# Patient Record
Sex: FEMALE | Race: OTHER | HISPANIC OR LATINO
[De-identification: names, ages, dates, MRNs, and addresses within clinical notes are randomized per-mention and may not be internally consistent; named-entity substitution may affect disease eponyms.]

---

## 2017-02-02 ENCOUNTER — RECORD ABSTRACTING (OUTPATIENT)
Age: 42
End: 2017-02-02

## 2017-02-21 ENCOUNTER — APPOINTMENT (OUTPATIENT)
Dept: DERMATOLOGY | Facility: CLINIC | Age: 42
End: 2017-02-21

## 2017-03-08 ENCOUNTER — APPOINTMENT (OUTPATIENT)
Dept: UROGYNECOLOGY | Facility: CLINIC | Age: 42
End: 2017-03-08

## 2017-03-08 VITALS — DIASTOLIC BLOOD PRESSURE: 60 MMHG | SYSTOLIC BLOOD PRESSURE: 112 MMHG | WEIGHT: 138 LBS | BODY MASS INDEX: 23.69 KG/M2

## 2017-03-08 DIAGNOSIS — M79.602 PAIN IN RIGHT ARM: ICD-10-CM

## 2017-03-08 DIAGNOSIS — M79.601 PAIN IN RIGHT ARM: ICD-10-CM

## 2017-04-11 ENCOUNTER — APPOINTMENT (OUTPATIENT)
Dept: NEUROLOGY | Facility: CLINIC | Age: 42
End: 2017-04-11

## 2017-04-11 VITALS
HEIGHT: 64 IN | DIASTOLIC BLOOD PRESSURE: 90 MMHG | BODY MASS INDEX: 23.9 KG/M2 | WEIGHT: 140 LBS | HEART RATE: 82 BPM | SYSTOLIC BLOOD PRESSURE: 130 MMHG

## 2017-04-11 DIAGNOSIS — G56.00 CARPAL TUNNEL SYNDROME, UNSPECIFIED UPPER LIMB: ICD-10-CM

## 2017-04-13 ENCOUNTER — APPOINTMENT (OUTPATIENT)
Dept: UROLOGY | Facility: CLINIC | Age: 42
End: 2017-04-13

## 2017-04-13 VITALS — DIASTOLIC BLOOD PRESSURE: 80 MMHG | SYSTOLIC BLOOD PRESSURE: 120 MMHG | HEART RATE: 74 BPM

## 2017-04-13 DIAGNOSIS — N20.0 CALCULUS OF KIDNEY: ICD-10-CM

## 2017-04-25 ENCOUNTER — APPOINTMENT (OUTPATIENT)
Dept: OBGYN | Facility: CLINIC | Age: 42
End: 2017-04-25

## 2017-04-25 ENCOUNTER — OUTPATIENT (OUTPATIENT)
Dept: OUTPATIENT SERVICES | Facility: HOSPITAL | Age: 42
LOS: 1 days | Discharge: HOME | End: 2017-04-25

## 2017-04-25 VITALS
SYSTOLIC BLOOD PRESSURE: 120 MMHG | DIASTOLIC BLOOD PRESSURE: 70 MMHG | WEIGHT: 141 LBS | HEIGHT: 63 IN | BODY MASS INDEX: 24.98 KG/M2

## 2017-04-25 DIAGNOSIS — R76.11 NONSPECIFIC REACTION TO TUBERCULIN SKIN TEST W/OUT ACTIVE TUBERCULOSIS: ICD-10-CM

## 2017-04-25 DIAGNOSIS — Z83.3 FAMILY HISTORY OF DIABETES MELLITUS: ICD-10-CM

## 2017-04-25 DIAGNOSIS — Z78.9 OTHER SPECIFIED HEALTH STATUS: ICD-10-CM

## 2017-04-26 PROBLEM — Z78.9 DOES NOT USE ILLICIT DRUGS: Status: ACTIVE | Noted: 2017-04-26

## 2017-04-26 PROBLEM — R76.11 PPD POSITIVE: Status: RESOLVED | Noted: 2017-04-26 | Resolved: 2017-04-26

## 2017-05-01 ENCOUNTER — RESULT REVIEW (OUTPATIENT)
Age: 42
End: 2017-05-01

## 2017-05-08 LAB
HPV I/H RISK 1 DNA CVX QL PROBE+SIG AMP: NOT DETECTED
HPV LOW RISK DNA CVX QL PROBE+SIG AMP: NOT DETECTED

## 2017-06-28 DIAGNOSIS — Z01.20 ENCOUNTER FOR DENTAL EXAMINATION AND CLEANING WITHOUT ABNORMAL FINDINGS: ICD-10-CM

## 2017-09-20 ENCOUNTER — APPOINTMENT (OUTPATIENT)
Dept: UROGYNECOLOGY | Facility: CLINIC | Age: 42
End: 2017-09-20

## 2018-07-03 ENCOUNTER — RX RENEWAL (OUTPATIENT)
Age: 43
End: 2018-07-03

## 2018-08-07 ENCOUNTER — APPOINTMENT (OUTPATIENT)
Dept: NEUROLOGY | Facility: CLINIC | Age: 43
End: 2018-08-07

## 2018-10-02 ENCOUNTER — APPOINTMENT (OUTPATIENT)
Dept: NEUROLOGY | Facility: CLINIC | Age: 43
End: 2018-10-02

## 2018-10-30 ENCOUNTER — APPOINTMENT (OUTPATIENT)
Dept: NEUROLOGY | Facility: CLINIC | Age: 43
End: 2018-10-30

## 2018-10-30 ENCOUNTER — OUTPATIENT (OUTPATIENT)
Dept: OUTPATIENT SERVICES | Facility: HOSPITAL | Age: 43
LOS: 1 days | Discharge: HOME | End: 2018-10-30

## 2018-10-30 VITALS
WEIGHT: 141 LBS | TEMPERATURE: 98.2 F | HEIGHT: 63 IN | SYSTOLIC BLOOD PRESSURE: 128 MMHG | DIASTOLIC BLOOD PRESSURE: 83 MMHG | BODY MASS INDEX: 24.98 KG/M2 | HEART RATE: 86 BPM

## 2018-10-30 NOTE — HISTORY OF PRESENT ILLNESS
[FreeTextEntry1] : follow up  [de-identified] : 42 year old female with history of migraine and hand weakness, presents for follow up \par She didn't have any migraine episode for the past 4 months, taking Amytriptan 10 mg daily and Excedrin. \par Last visit she was complaining of wrist weakness; for which wrist splint prescribed and EMG/NCS ordered \par weakness improved by itself as per patient, she didn't’t use the splint. \par she feels well this visit, denies any nausea, vomiting or headache \par

## 2018-10-30 NOTE — END OF VISIT
[] : Resident [FreeTextEntry3] : Patient examined.  Headaches much improved, no longer needs amitriptyline (wasn't taking it regularly anyway).\par F/u as needed.

## 2018-10-30 NOTE — ASSESSMENT
[FreeTextEntry1] : patient is 43 year old female with PMH of migraine \par presents for follow up \par \par - no migraine attacks for 4 months \par - will d/c Amitriptyline \par - follow up as needed

## 2018-12-05 ENCOUNTER — APPOINTMENT (OUTPATIENT)
Dept: INTERNAL MEDICINE | Facility: CLINIC | Age: 43
End: 2018-12-05

## 2018-12-14 ENCOUNTER — APPOINTMENT (OUTPATIENT)
Dept: GASTROENTEROLOGY | Facility: CLINIC | Age: 43
End: 2018-12-14

## 2018-12-14 ENCOUNTER — OUTPATIENT (OUTPATIENT)
Dept: OUTPATIENT SERVICES | Facility: HOSPITAL | Age: 43
LOS: 1 days | Discharge: HOME | End: 2018-12-14

## 2018-12-14 VITALS
HEART RATE: 79 BPM | WEIGHT: 143 LBS | HEIGHT: 63 IN | SYSTOLIC BLOOD PRESSURE: 148 MMHG | BODY MASS INDEX: 25.34 KG/M2 | DIASTOLIC BLOOD PRESSURE: 90 MMHG

## 2018-12-14 DIAGNOSIS — K59.00 CONSTIPATION, UNSPECIFIED: ICD-10-CM

## 2019-01-14 LAB
ALBUMIN SERPL ELPH-MCNC: 4.4 G/DL
ALP BLD-CCNC: 79 U/L
ALT SERPL-CCNC: 22 U/L
ANION GAP SERPL CALC-SCNC: 15 MMOL/L
AST SERPL-CCNC: 27 U/L
BASOPHILS # BLD AUTO: 0.04 K/UL
BASOPHILS NFR BLD AUTO: 0.7 %
BILIRUB SERPL-MCNC: 0.6 MG/DL
BUN SERPL-MCNC: 10 MG/DL
CALCIUM SERPL-MCNC: 8.9 MG/DL
CHLORIDE SERPL-SCNC: 104 MMOL/L
CO2 SERPL-SCNC: 22 MMOL/L
CREAT SERPL-MCNC: 0.6 MG/DL
EOSINOPHIL # BLD AUTO: 0.11 K/UL
EOSINOPHIL NFR BLD AUTO: 1.8 %
GLUCOSE SERPL-MCNC: 88 MG/DL
HCT VFR BLD CALC: 34.5 %
HGB BLD-MCNC: 10.9 G/DL
IMM GRANULOCYTES NFR BLD AUTO: 0.3 %
INR PPP: 0.95 RATIO
LYMPHOCYTES # BLD AUTO: 1.69 K/UL
LYMPHOCYTES NFR BLD AUTO: 28 %
MAN DIFF?: NORMAL
MCHC RBC-ENTMCNC: 26.7 PG
MCHC RBC-ENTMCNC: 31.6 G/DL
MCV RBC AUTO: 84.4 FL
MONOCYTES # BLD AUTO: 0.37 K/UL
MONOCYTES NFR BLD AUTO: 6.1 %
NEUTROPHILS # BLD AUTO: 3.81 K/UL
NEUTROPHILS NFR BLD AUTO: 63.1 %
PLATELET # BLD AUTO: 258 K/UL
POTASSIUM SERPL-SCNC: 4.4 MMOL/L
PROT SERPL-MCNC: 6.5 G/DL
PT BLD: 10.9 SEC
RBC # BLD: 4.09 M/UL
RBC # FLD: 15 %
SODIUM SERPL-SCNC: 141 MMOL/L
TSH SERPL-ACNC: 2.53 UIU/ML
WBC # FLD AUTO: 6.04 K/UL

## 2019-01-23 ENCOUNTER — OUTPATIENT (OUTPATIENT)
Dept: OUTPATIENT SERVICES | Facility: HOSPITAL | Age: 44
LOS: 1 days | Discharge: HOME | End: 2019-01-23

## 2019-01-23 ENCOUNTER — TRANSCRIPTION ENCOUNTER (OUTPATIENT)
Age: 44
End: 2019-01-23

## 2019-01-23 VITALS
RESPIRATION RATE: 18 BRPM | SYSTOLIC BLOOD PRESSURE: 124 MMHG | OXYGEN SATURATION: 98 % | DIASTOLIC BLOOD PRESSURE: 86 MMHG | HEART RATE: 78 BPM

## 2019-01-23 VITALS
HEART RATE: 83 BPM | RESPIRATION RATE: 18 BRPM | WEIGHT: 134.04 LBS | HEIGHT: 64 IN | SYSTOLIC BLOOD PRESSURE: 135 MMHG | TEMPERATURE: 98 F | DIASTOLIC BLOOD PRESSURE: 94 MMHG

## 2019-01-23 NOTE — CHART NOTE - NSCHARTNOTEFT_GEN_A_CORE
PACU ANESTHESIA ADMISSION NOTE      Procedure: Colonoscopy  Post op diagnosis:      ____  Intubated  TV:______       Rate: ______      FiO2: ______    _x___  Patent Airway    _x___  Full return of protective reflexes    _x___  Full recovery from anesthesia / back to baseline status    Vitals:  BP:            100/53     HR:  70  RR:   20  Temp:  O2Sat:  98    Mental Status:  _x___ Awake   _____ Alert   _____ Drowsy   _____ Sedated    Nausea/Vomiting:  _x___  NO       ______Yes,   See Post - Op Orders         Pain Scale (0-10):  __0___    Treatment: _x___ None    ____ See Post - Op/PCA Orders    Post - Operative Fluids:   __x__ Oral   ____ See Post - Op Orders    Plan: Discharge:   _x___Home       _____Floor     _____Critical Care    _____  Other:_________________    Comments:  Intraoperative course uneventful.  No anesthesia issues or complications noted.  Discharge when criteria met.

## 2019-01-23 NOTE — ASU DISCHARGE PLAN (ADULT/PEDIATRIC). - NOTIFY
Bleeding that does not stop/Excessive Diarrhea/Pain not relieved by Medications/Persistent Nausea and Vomiting/Swelling that continues/Unable to Urinate/Fever greater than 101/Inability to Tolerate Liquids or Foods

## 2019-01-23 NOTE — ASU PREOP CHECKLIST - 1.
pt.states Jehovah witness. Pt. presented no blood health care proxy. [facility] medicine [facility] program contacted. Message left. Blood refusal consent signed. MD Fu aware pt.states Jehovah witness. Pt. presented no blood health care proxy. [facility] program contacted. Message left. Blood refusal consent signed. MD Fu aware by ERNA Muñoz. Blood consent refusal to be signed . pt.states Jehovah witness. Pt. presented no blood health care proxy. [facility] program contacted. Message left. Blood refusal consent to be signed. MD Fu aware by ERNA Muñoz.

## 2019-01-23 NOTE — H&P PST ADULT - HISTORY OF PRESENT ILLNESS
patient s here for colonoscopy for chronic constipation and LLQ pain patient s here for colonoscopy for anemia, change in bms (constipation) and LLQ pain

## 2019-01-23 NOTE — ASU PATIENT PROFILE, ADULT - BLOOD AVOIDANCE/RESTRICTIONS, PROFILE
Uatsdin beliefs/Message left for bloodless medicine surgery program, blood refusal consent signed and MD Fu aware by ERNA Muñoz Message left for bloodless medicine surgery program, blood refusal consent to be signed and MD Fu aware by RN Wanda/Rastafari beliefs

## 2019-01-28 DIAGNOSIS — D64.9 ANEMIA, UNSPECIFIED: ICD-10-CM

## 2019-01-28 DIAGNOSIS — K64.4 RESIDUAL HEMORRHOIDAL SKIN TAGS: ICD-10-CM

## 2019-01-28 DIAGNOSIS — K64.8 OTHER HEMORRHOIDS: ICD-10-CM

## 2019-03-08 ENCOUNTER — OUTPATIENT (OUTPATIENT)
Dept: OUTPATIENT SERVICES | Facility: HOSPITAL | Age: 44
LOS: 1 days | Discharge: HOME | End: 2019-03-08

## 2019-03-08 ENCOUNTER — APPOINTMENT (OUTPATIENT)
Dept: GASTROENTEROLOGY | Facility: CLINIC | Age: 44
End: 2019-03-08

## 2019-03-08 VITALS
DIASTOLIC BLOOD PRESSURE: 80 MMHG | BODY MASS INDEX: 24.98 KG/M2 | HEIGHT: 63 IN | SYSTOLIC BLOOD PRESSURE: 124 MMHG | HEART RATE: 86 BPM | WEIGHT: 141 LBS

## 2019-03-08 NOTE — ASSESSMENT
[FreeTextEntry1] : A 44 y old female patient with a hx of Migraine headache presented for follow up colonoscopy results. Patient denies any abdominal pain , vomiting, diarrhea, blood in the stools. Patient now taking Miralax once daily and she is going once daily to the bathroom . \par Denies any complaints \par  \par # Constipation, sx resolved on Miralax\par Blood work normal \par Colonoscopy showed internal hemorrhoids \par Continue Miralax \par Follow with GI as needed

## 2019-03-08 NOTE — PHYSICAL EXAM
[General Appearance - Alert] : alert [General Appearance - In No Acute Distress] : in no acute distress [General Appearance - Well Nourished] : well nourished [Sclera] : the sclera and conjunctiva were normal [Extraocular Movements] : extraocular movements were intact [Outer Ear] : the ears and nose were normal in appearance [Hearing Threshold Finger Rub Not [place]] : hearing was normal [Neck Appearance] : the appearance of the neck was normal [Neck Cervical Mass (___cm)] : no neck mass was observed [] : no respiratory distress [Exaggerated Use Of Accessory Muscles For Inspiration] : no accessory muscle use [Bowel Sounds] : normal bowel sounds [Abdomen Soft] : soft [Abdomen Tenderness] : non-tender [No CVA Tenderness] : no ~M costovertebral angle tenderness [Abnormal Walk] : normal gait [Oriented To Time, Place, And Person] : oriented to person, place, and time

## 2019-03-08 NOTE — HISTORY OF PRESENT ILLNESS
[de-identified] : A 44 y old female patient with a hx of Migraine headache presented for follow up colonoscopy results. Patient denies any abdominal pain , vomiting, diarrhea, blood in the stools. Patient now taking Miralax once daily and she is going once daily to the bathroom . \par Denies any complaints \par

## 2019-12-23 NOTE — PRE-ANESTHESIA EVALUATION ADULT - WEIGHT IN KG
----- Message from Shu Bourgeois MD sent at 12/22/2019  3:41 PM EST -----  Can call family with results - throat culture was negative. Call if new/worsening symptoms.
Mother notified of negative throat culture results, verbalized understanding.
60.8
60.8

## 2020-02-03 ENCOUNTER — OUTPATIENT (OUTPATIENT)
Dept: OUTPATIENT SERVICES | Facility: HOSPITAL | Age: 45
LOS: 1 days | Discharge: HOME | End: 2020-02-03

## 2020-02-03 ENCOUNTER — APPOINTMENT (OUTPATIENT)
Dept: OBGYN | Facility: CLINIC | Age: 45
End: 2020-02-03
Payer: COMMERCIAL

## 2020-02-03 VITALS
SYSTOLIC BLOOD PRESSURE: 118 MMHG | WEIGHT: 127 LBS | DIASTOLIC BLOOD PRESSURE: 76 MMHG | HEIGHT: 63 IN | BODY MASS INDEX: 22.5 KG/M2

## 2020-02-03 PROCEDURE — 99386 PREV VISIT NEW AGE 40-64: CPT

## 2020-02-03 NOTE — HISTORY OF PRESENT ILLNESS
[___ Year(s) Ago] : [unfilled] year(s) ago [Last Pap ___] : Last cervical pap smear was [unfilled] [Regular Cycle Intervals] : periods have been regular [Sexually Active] : is sexually active

## 2020-02-04 DIAGNOSIS — Z01.419 ENCOUNTER FOR GYNECOLOGICAL EXAMINATION (GENERAL) (ROUTINE) WITHOUT ABNORMAL FINDINGS: ICD-10-CM

## 2020-02-05 LAB — HPV HIGH+LOW RISK DNA PNL CVX: NOT DETECTED

## 2020-02-17 ENCOUNTER — FORM ENCOUNTER (OUTPATIENT)
Age: 45
End: 2020-02-17

## 2020-02-18 ENCOUNTER — OUTPATIENT (OUTPATIENT)
Dept: OUTPATIENT SERVICES | Facility: HOSPITAL | Age: 45
LOS: 1 days | Discharge: HOME | End: 2020-02-18
Payer: OTHER GOVERNMENT

## 2020-02-18 DIAGNOSIS — Z12.31 ENCOUNTER FOR SCREENING MAMMOGRAM FOR MALIGNANT NEOPLASM OF BREAST: ICD-10-CM

## 2020-02-18 PROCEDURE — 77067 SCR MAMMO BI INCL CAD: CPT | Mod: 26

## 2020-02-18 PROCEDURE — 77063 BREAST TOMOSYNTHESIS BI: CPT | Mod: 26

## 2020-05-22 ENCOUNTER — APPOINTMENT (OUTPATIENT)
Dept: UROGYNECOLOGY | Facility: CLINIC | Age: 45
End: 2020-05-22
Payer: COMMERCIAL

## 2020-05-22 ENCOUNTER — OUTPATIENT (OUTPATIENT)
Dept: OUTPATIENT SERVICES | Facility: HOSPITAL | Age: 45
LOS: 1 days | Discharge: HOME | End: 2020-05-22

## 2020-05-22 VITALS
HEIGHT: 63 IN | BODY MASS INDEX: 23.04 KG/M2 | SYSTOLIC BLOOD PRESSURE: 90 MMHG | DIASTOLIC BLOOD PRESSURE: 60 MMHG | WEIGHT: 130 LBS

## 2020-05-22 DIAGNOSIS — Z87.448 PERSONAL HISTORY OF OTHER DISEASES OF URINARY SYSTEM: ICD-10-CM

## 2020-05-22 DIAGNOSIS — Z87.898 PERSONAL HISTORY OF OTHER SPECIFIED CONDITIONS: ICD-10-CM

## 2020-05-22 DIAGNOSIS — M79.18 MYALGIA, OTHER SITE: ICD-10-CM

## 2020-05-22 DIAGNOSIS — R29.898 OTHER SYMPTOMS AND SIGNS INVOLVING THE MUSCULOSKELETAL SYSTEM: ICD-10-CM

## 2020-05-22 DIAGNOSIS — Z01.419 ENCOUNTER FOR GYNECOLOGICAL EXAMINATION (GENERAL) (ROUTINE) W/OUT ABNORMAL FINDINGS: ICD-10-CM

## 2020-05-22 DIAGNOSIS — N39.41 URGE INCONTINENCE: ICD-10-CM

## 2020-05-22 DIAGNOSIS — N39.3 STRESS INCONTINENCE (FEMALE) (MALE): ICD-10-CM

## 2020-05-22 DIAGNOSIS — N81.2 INCOMPLETE UTEROVAGINAL PROLAPSE: ICD-10-CM

## 2020-05-22 DIAGNOSIS — Z87.39 PERSONAL HISTORY OF OTHER DISEASES OF THE MUSCULOSKELETAL SYSTEM AND CONNECTIVE TISSUE: ICD-10-CM

## 2020-05-22 DIAGNOSIS — R10.2 PELVIC AND PERINEAL PAIN: ICD-10-CM

## 2020-05-22 DIAGNOSIS — N39.46 MIXED INCONTINENCE: ICD-10-CM

## 2020-05-22 DIAGNOSIS — Z12.4 ENCOUNTER FOR SCREENING FOR MALIGNANT NEOPLASM OF CERVIX: ICD-10-CM

## 2020-05-22 PROCEDURE — 99205 OFFICE O/P NEW HI 60 MIN: CPT | Mod: 25

## 2020-05-22 PROCEDURE — 51701 INSERT BLADDER CATHETER: CPT

## 2020-05-22 NOTE — PHYSICAL EXAM
[Chaperone Present] : A chaperone was present in the examining room during all aspects of the physical examination [FreeTextEntry1] : Void: 500  cc\par PVR: 100 cc (normal PVR for this volume voided is 200cc or less)\par Urethra was prepped in sterile fashion and then a sterile catheter was used by me to drain the bladder.\par \par GH: 5.5    pB:  4.5  TVL: 12  C: -6  D: -9.5  Aa: -1  Ba: -1  Ap: 0  Bp: 0\par  \par No abdominal incisions noted\par normal perineal sensation\par normal perineal reflexes\par negative cough stress test\par negative atrophy\par positive urethral hypermobility\par bilateral levator ani spasm, positive tenderness\par no urethral tenderness\par no bladder tenderness\par no cervical tenderness\par no uterine tenderness\par no Mesh exposure\par 2/5 Kegel\par

## 2020-05-22 NOTE — DISCUSSION/SUMMARY
[FreeTextEntry1] : \par Myalgia-\par Discussed the pathophysiology of the above condition. Reviewed management options including medications (oral or vaginal suppository), injections, pelvic floor physical therapy, or referral for possible pudendal nerve blocks. The patient agrees to a referral to PT and medical management. The risks and benefits of flexeril was reviewed.\par \par Uterovaginal prolapse incomplete-\par The patient was counseled regarding the possible natural progression of prolapse and the clinical consequences of worsening prolapse. The stage and the location of the prolapse was reviewed with the patient. She was counseled regarding the management strategies including observation, pelvic floor physical therapy, pessary placement and surgery. The patient voiced understanding and agrees with a referral to PT.\par \par Urge incontinence-\par The pathophysiology of the above condition was discussed with the patient. The patient was given information and education on pelvic floor muscle exercises/rehabilitation, avoidance of dietary bladder irritants, and other strategies to improve bladder control, such as scheduled voiding. She was counseled regarding further management strategies for overactive bladder and urge incontinence including pelvic floor physical therapy, medications or surgical management. The patient voiced understanding and agrees with diet changes and a referral to PT. We will follow up on her urine tests.\par \par \par \par \par \par

## 2020-05-22 NOTE — HISTORY OF PRESENT ILLNESS
[FreeTextEntry1] : \par Pt with pelvic floor dysfunction here for urogynecologic evaluation. She describes: \par Referring provider: Dr López\par \par Chief PFD: pelvic pain\par \par 1/5/16 altis mini sling-excised for pain at the anchor sites\par 7/5/16 TVT\par \par Pelvic organ prolapse: s/p tubal, +bulge, for around 1 year, worsening, denies splinting\par Stress urinary incontinence: as above, no JUSTINO\par Overactive bladder syndrome: voids q20-30 minutes secondary to incontinence and pain that improves somewhat after urination, no eneuresis, urge incontinence daily, since the first surgery, s/p oxybutynin ER 10mg, does not take it anymore because she does not want to be addicted and because it made her mouth very dry, no glaucoma\par Voiding dysfunction: no Incomplete bladder emptying, yes hesitancy \par Lower urinary tract/vaginal symptoms: no recurrent UTIs per year, no hematuria, no dysuria, no bladder pain \par Fecal incontinence: denies\par Defecatory dysfunction: sausage\par Sexual dysfunction:  not active\par Pelvic pain: reports pain with working out, goes from the vagina and down her leg, sharp, intermittent\par Vaginal dryness: denies\par \par Her pelvic floor symptoms are significantly bothersome and negatively impacting her quality of life. \par \par

## 2020-05-22 NOTE — COUNSELING
[FreeTextEntry1] : \par We will notify you of the urine results if they are abnormal\par \par For 4-5 days, cut one item from the list out of your diet.\par \par After 4-5 days, it it makes a difference, you have to decide if it is worth keeping it out of your diet to help with the urinary issues.\par \par If it does not make a difference, you should add it back into your diet and remove another item for another 4-5 days.\par \par Please start the flexeril (muscle relaxant) twice a day for the pain. It can make you sleepy\par \par Please call my office if you have any issues with the cost or side effects of the medication.\par \par Referral to pelvic floor PT\par \par [facility]\par [address] [place] [id number]\par \par \par [facility]\par [place], [place]\par \par \par Schedule 6 week follow up with my PAJennifer (myalgia/UUI)

## 2020-05-26 LAB
APPEARANCE: CLEAR
BACTERIA UR CULT: NORMAL
BILIRUBIN URINE: NEGATIVE
BLOOD URINE: NEGATIVE
COLOR: COLORLESS
GLUCOSE QUALITATIVE U: NEGATIVE
KETONES URINE: NEGATIVE
LEUKOCYTE ESTERASE URINE: NEGATIVE
NITRITE URINE: NEGATIVE
PH URINE: 6.5
PROTEIN URINE: NEGATIVE
SPECIFIC GRAVITY URINE: 1
UROBILINOGEN URINE: NORMAL

## 2020-05-27 DIAGNOSIS — N39.41 URGE INCONTINENCE: ICD-10-CM

## 2020-05-27 DIAGNOSIS — M79.18 MYALGIA, OTHER SITE: ICD-10-CM

## 2020-05-27 DIAGNOSIS — N81.2 INCOMPLETE UTEROVAGINAL PROLAPSE: ICD-10-CM

## 2020-07-10 ENCOUNTER — APPOINTMENT (OUTPATIENT)
Dept: UROGYNECOLOGY | Facility: CLINIC | Age: 45
End: 2020-07-10

## 2020-07-10 RX ORDER — CYCLOBENZAPRINE HYDROCHLORIDE 5 MG/1
5 TABLET, FILM COATED ORAL
Qty: 60 | Refills: 1 | Status: DISCONTINUED | COMMUNITY
Start: 2020-05-22 | End: 2020-07-10

## 2020-07-10 RX ORDER — BACLOFEN 20 MG/1
20 TABLET ORAL
Qty: 60 | Refills: 1 | Status: ACTIVE | COMMUNITY
Start: 2020-07-10 | End: 1900-01-01

## 2020-08-21 ENCOUNTER — APPOINTMENT (OUTPATIENT)
Dept: UROGYNECOLOGY | Facility: CLINIC | Age: 45
End: 2020-08-21

## 2021-06-27 ENCOUNTER — EMERGENCY (EMERGENCY)
Facility: HOSPITAL | Age: 46
LOS: 0 days | Discharge: HOME | End: 2021-06-28
Attending: STUDENT IN AN ORGANIZED HEALTH CARE EDUCATION/TRAINING PROGRAM | Admitting: STUDENT IN AN ORGANIZED HEALTH CARE EDUCATION/TRAINING PROGRAM
Payer: MEDICAID

## 2021-06-27 VITALS
RESPIRATION RATE: 17 BRPM | HEIGHT: 64 IN | OXYGEN SATURATION: 100 % | WEIGHT: 134.92 LBS | DIASTOLIC BLOOD PRESSURE: 78 MMHG | SYSTOLIC BLOOD PRESSURE: 147 MMHG | HEART RATE: 82 BPM | TEMPERATURE: 98 F

## 2021-06-27 DIAGNOSIS — S62.609A FRACTURE OF UNSPECIFIED PHALANX OF UNSPECIFIED FINGER, INITIAL ENCOUNTER FOR CLOSED FRACTURE: ICD-10-CM
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
## 2021-06-27 DIAGNOSIS — M79.89 OTHER SPECIFIED SOFT TISSUE DISORDERS: ICD-10-CM

## 2021-06-27 DIAGNOSIS — M79.641 PAIN IN RIGHT HAND: ICD-10-CM
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
## 2021-06-27 PROCEDURE — 99284 EMERGENCY DEPT VISIT MOD MDM: CPT

## 2021-06-27 PROCEDURE — 73130 X-RAY EXAM OF HAND: CPT | Mod: 26,RT

## 2021-06-27 RX ORDER — IBUPROFEN 200 MG
600 TABLET ORAL ONCE
Refills: 0 | Status: COMPLETED | OUTPATIENT
Start: 2021-06-27 | End: 2021-06-27

## 2021-06-27 RX ADMIN — Medication 600 MILLIGRAM(S): at 22:18
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
## 2021-06-27 NOTE — ED PROVIDER NOTE - PHYSICAL EXAMINATION
CONST: NAD  EYES: Sclera and conjunctiva clear.   ENT: No nasal discharge. Oropharynx normal appearing, no erythema or exudates. No abscess or swelling. Uvula midline.   NECK: Non-tender, no meningeal signs. normal ROM. supple   CARD: S1 S2; No jvd  RESP: Equal BS B/L, No wheezes, rhonchi or rales. No distress  GI: Soft, non-tender, non-distended. no cva tenderness. normal BS  MS: Tenderness to R 1st, 4rd and 4th MCP. Normal ROM in all extremities. pulses 2 +.   SKIN: Mild swelling to R hand  NEURO: A&Ox3, No focal deficits. Strength 5/5 with no sensory deficits. Steady gait.
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
## 2021-06-27 NOTE — ED PROVIDER NOTE - NS ED ROS FT
Constitutional: (-) fever  Eyes/ENT: (-) blurry vision, (-) epistaxis  Cardiovascular: (-) chest pain, (-) syncope  Respiratory: (-) cough, (-) shortness of breath  Gastrointestinal: (-) vomiting, (-) diarrhea  : (-) dysuria, (-) hematuria  Musculoskeletal: (+) R hand pain, (-) neck pain, (-) back pain  Integumentary: (-) rash, (-) edema  Neurological: (-) headache, (-) altered mental status  Allergic/Immunologic: (-) pruritus
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
## 2021-06-27 NOTE — ED PROVIDER NOTE - CARE PROVIDER_API CALL
Bravo Salinas)  Orthopaedic Surgery  [address] [place] [address]  Phone: [phone]  Fax: [phone]  Follow Up Time:

## 2022-06-06 ENCOUNTER — APPOINTMENT (OUTPATIENT)
Dept: OBGYN | Facility: CLINIC | Age: 47
End: 2022-06-06
Payer: COMMERCIAL

## 2022-06-06 ENCOUNTER — OUTPATIENT (OUTPATIENT)
Dept: OUTPATIENT SERVICES | Facility: HOSPITAL | Age: 47
LOS: 1 days | Discharge: HOME | End: 2022-06-06

## 2022-06-06 VITALS
WEIGHT: 133 LBS | HEIGHT: 63 IN | BODY MASS INDEX: 23.57 KG/M2 | DIASTOLIC BLOOD PRESSURE: 70 MMHG | SYSTOLIC BLOOD PRESSURE: 122 MMHG

## 2022-06-06 PROCEDURE — 99396 PREV VISIT EST AGE 40-64: CPT

## 2022-06-06 NOTE — HISTORY OF PRESENT ILLNESS
[FreeTextEntry1] : 47 p2 for annual\par No period for 8 months\par Having some menopausal symptoms (hot flashes)\par Otherwise no complaints.\par  [Patient reported mammogram was normal] : Patient reported mammogram was normal [Patient reported PAP Smear was normal] : Patient reported PAP Smear was normal [Mammogramdate] : 2020 [PapSmeardate] : 2020

## 2022-06-08 ENCOUNTER — OUTPATIENT (OUTPATIENT)
Dept: OUTPATIENT SERVICES | Facility: HOSPITAL | Age: 47
LOS: 1 days | Discharge: HOME | End: 2022-06-08
Payer: OTHER GOVERNMENT

## 2022-06-08 ENCOUNTER — RESULT REVIEW (OUTPATIENT)
Age: 47
End: 2022-06-08

## 2022-06-08 DIAGNOSIS — Z12.31 ENCOUNTER FOR SCREENING MAMMOGRAM FOR MALIGNANT NEOPLASM OF BREAST: ICD-10-CM

## 2022-06-08 PROCEDURE — 77067 SCR MAMMO BI INCL CAD: CPT | Mod: 26

## 2022-06-08 PROCEDURE — 77063 BREAST TOMOSYNTHESIS BI: CPT | Mod: 26

## 2022-11-28 NOTE — ED PROVIDER NOTE - CCCP TRG CHIEF CMPLNT
hand pain/injury [FreeTextEntry3] : Large joint injection was performed of the BILAT knee. \par The indication for this procedure was pain, inflammation and x-ray evidence of Osteoarthritis on this or prior visit. The site was prepped with alcohol, betadine, ethyl chloride sprayed topically and sterile technique used. \par An injection of ORTHOVISC 2ml, series #2 was used.  \par Patient was advised to call if redness, pain or fever occur, apply ice for 15 minutes out of every hour for the next 12-24 hours as tolerated and patient was advised to rest the joint(s) for 2 days. Patient has tried OTC's including aspirin, Ibuprofen, Aleve, etc or prescription NSAIDS, and/or exercises at home and/or physical therapy without satisfactory response, patient had decreased mobility in the joint and the risks benefits, and alternatives have been discussed, and verbal consent was obtained. \par

## 2024-05-13 ENCOUNTER — OUTPATIENT (OUTPATIENT)
Dept: OUTPATIENT SERVICES | Facility: HOSPITAL | Age: 49
LOS: 1 days | End: 2024-05-13
Payer: COMMERCIAL

## 2024-05-13 ENCOUNTER — APPOINTMENT (OUTPATIENT)
Dept: OBGYN | Facility: CLINIC | Age: 49
End: 2024-05-13
Payer: COMMERCIAL

## 2024-05-13 VITALS
RESPIRATION RATE: 20 BRPM | OXYGEN SATURATION: 100 % | HEIGHT: 63 IN | BODY MASS INDEX: 23.99 KG/M2 | SYSTOLIC BLOOD PRESSURE: 124 MMHG | DIASTOLIC BLOOD PRESSURE: 70 MMHG | WEIGHT: 135.4 LBS | HEART RATE: 88 BPM

## 2024-05-13 DIAGNOSIS — Z01.419 ENCOUNTER FOR GYNECOLOGICAL EXAMINATION (GENERAL) (ROUTINE) WITHOUT ABNORMAL FINDINGS: ICD-10-CM

## 2024-05-13 DIAGNOSIS — Z01.419 ENCOUNTER FOR GYNECOLOGICAL EXAMINATION (GENERAL) (ROUTINE) W/OUT ABNORMAL FINDINGS: ICD-10-CM

## 2024-05-13 DIAGNOSIS — N95.1 MENOPAUSAL AND FEMALE CLIMACTERIC STATES: ICD-10-CM

## 2024-05-13 PROCEDURE — T1013: CPT

## 2024-05-13 PROCEDURE — 99213 OFFICE O/P EST LOW 20 MIN: CPT | Mod: 25

## 2024-05-13 PROCEDURE — 99459 PELVIC EXAMINATION: CPT

## 2024-05-13 PROCEDURE — 87624 HPV HI-RISK TYP POOLED RSLT: CPT

## 2024-05-13 PROCEDURE — 99396 PREV VISIT EST AGE 40-64: CPT

## 2024-05-13 PROCEDURE — 88142 CYTOPATH C/V THIN LAYER: CPT

## 2024-05-13 RX ORDER — CITALOPRAM HYDROBROMIDE 20 MG/1
20 TABLET, FILM COATED ORAL DAILY
Qty: 30 | Refills: 11 | Status: ACTIVE | COMMUNITY
Start: 2024-05-13 | End: 1900-01-01

## 2024-05-13 NOTE — PLAN
[FreeTextEntry1] : Normal Annual Pap/hpv/mammo  Hot flashes/depression - start celexa 20mg po qd  f/u 2 months telehealth.

## 2024-05-13 NOTE — PHYSICAL EXAM
[Chaperone Present] : A chaperone was present in the examining room during all aspects of the physical examination [11528] : A chaperone was present during the pelvic exam. [FreeTextEntry2] : Nicolle [Appropriately responsive] : appropriately responsive [Alert] : alert [No Acute Distress] : no acute distress [Soft] : soft [Non-tender] : non-tender [Non-distended] : non-distended [No HSM] : No HSM [No Lesions] : no lesions [No Mass] : no mass [Oriented x3] : oriented x3 [Examination Of The Breasts] : a normal appearance [No Masses] : no breast masses were palpable [Labia Majora] : normal [Labia Minora] : normal [Normal] : normal [Uterine Adnexae] : normal

## 2024-05-13 NOTE — HISTORY OF PRESENT ILLNESS
[FreeTextEntry1] : Patient here for annual C/O 1) Hot flashes 2) Depression.  She reports that her 16 year old son is depressed and is currently undergoing psychologic testing, patient is very tearful. She is not sexually active, No period > 1 year. No PMB

## 2024-05-14 ENCOUNTER — OUTPATIENT (OUTPATIENT)
Dept: OUTPATIENT SERVICES | Facility: HOSPITAL | Age: 49
LOS: 1 days | End: 2024-05-14

## 2024-05-14 DIAGNOSIS — Z01.419 ENCOUNTER FOR GYNECOLOGICAL EXAMINATION (GENERAL) (ROUTINE) WITHOUT ABNORMAL FINDINGS: ICD-10-CM

## 2024-05-15 DIAGNOSIS — Z01.419 ENCOUNTER FOR GYNECOLOGICAL EXAMINATION (GENERAL) (ROUTINE) WITHOUT ABNORMAL FINDINGS: ICD-10-CM

## 2024-05-15 DIAGNOSIS — F32.A DEPRESSION, UNSPECIFIED: ICD-10-CM

## 2024-05-15 DIAGNOSIS — N95.1 MENOPAUSAL AND FEMALE CLIMACTERIC STATES: ICD-10-CM

## 2024-05-15 LAB
CYTOLOGY CVX/VAG DOC THIN PREP: ABNORMAL
HPV HIGH+LOW RISK DNA PNL CVX: NOT DETECTED

## 2024-05-25 ENCOUNTER — OUTPATIENT (OUTPATIENT)
Dept: OUTPATIENT SERVICES | Facility: HOSPITAL | Age: 49
LOS: 1 days | End: 2024-05-25
Payer: COMMERCIAL

## 2024-05-25 ENCOUNTER — RESULT REVIEW (OUTPATIENT)
Age: 49
End: 2024-05-25

## 2024-05-25 DIAGNOSIS — Z12.31 ENCOUNTER FOR SCREENING MAMMOGRAM FOR MALIGNANT NEOPLASM OF BREAST: ICD-10-CM

## 2024-05-25 PROCEDURE — 77063 BREAST TOMOSYNTHESIS BI: CPT

## 2024-05-25 PROCEDURE — 77067 SCR MAMMO BI INCL CAD: CPT

## 2024-05-25 PROCEDURE — 77067 SCR MAMMO BI INCL CAD: CPT | Mod: 26

## 2024-05-25 PROCEDURE — 77063 BREAST TOMOSYNTHESIS BI: CPT | Mod: 26

## 2024-05-26 DIAGNOSIS — Z12.31 ENCOUNTER FOR SCREENING MAMMOGRAM FOR MALIGNANT NEOPLASM OF BREAST: ICD-10-CM

## 2024-06-02 ENCOUNTER — NON-APPOINTMENT (OUTPATIENT)
Age: 49
End: 2024-06-02

## 2024-06-03 ENCOUNTER — APPOINTMENT (OUTPATIENT)
Dept: INTERNAL MEDICINE | Facility: CLINIC | Age: 49
End: 2024-06-03

## 2024-06-03 ENCOUNTER — OUTPATIENT (OUTPATIENT)
Dept: OUTPATIENT SERVICES | Facility: HOSPITAL | Age: 49
LOS: 1 days | End: 2024-06-03
Payer: COMMERCIAL

## 2024-06-03 VITALS
TEMPERATURE: 97.2 F | SYSTOLIC BLOOD PRESSURE: 138 MMHG | WEIGHT: 139 LBS | BODY MASS INDEX: 24.63 KG/M2 | DIASTOLIC BLOOD PRESSURE: 88 MMHG | OXYGEN SATURATION: 97 % | HEART RATE: 72 BPM | HEIGHT: 63 IN

## 2024-06-03 VITALS — DIASTOLIC BLOOD PRESSURE: 89 MMHG | SYSTOLIC BLOOD PRESSURE: 142 MMHG

## 2024-06-03 DIAGNOSIS — R03.0 ELEVATED BLOOD-PRESSURE READING, W/OUT DIAGNOSIS OF HYPERTENSION: ICD-10-CM

## 2024-06-03 DIAGNOSIS — F32.A DEPRESSION, UNSPECIFIED: ICD-10-CM

## 2024-06-03 DIAGNOSIS — Z00.00 ENCOUNTER FOR GENERAL ADULT MEDICAL EXAMINATION W/OUT ABNORMAL FINDINGS: ICD-10-CM

## 2024-06-03 DIAGNOSIS — Z00.00 ENCOUNTER FOR GENERAL ADULT MEDICAL EXAMINATION WITHOUT ABNORMAL FINDINGS: ICD-10-CM

## 2024-06-03 PROCEDURE — 99204 OFFICE O/P NEW MOD 45 MIN: CPT

## 2024-06-03 PROCEDURE — 99214 OFFICE O/P EST MOD 30 MIN: CPT

## 2024-06-03 NOTE — PHYSICAL EXAM
[No Acute Distress] : no acute distress [Well Nourished] : well nourished [Well Developed] : well developed [Well-Appearing] : well-appearing [Normal Sclera/Conjunctiva] : normal sclera/conjunctiva [PERRL] : pupils equal round and reactive to light [EOMI] : extraocular movements intact [Normal Outer Ear/Nose] : the outer ears and nose were normal in appearance [Normal Oropharynx] : the oropharynx was normal [No JVD] : no jugular venous distention [Supple] : supple [No Respiratory Distress] : no respiratory distress  [No Accessory Muscle Use] : no accessory muscle use [Clear to Auscultation] : lungs were clear to auscultation bilaterally [Normal Rate] : normal rate  [Regular Rhythm] : with a regular rhythm [Normal S1, S2] : normal S1 and S2 [Pedal Pulses Present] : the pedal pulses are present [No Edema] : there was no peripheral edema [Soft] : abdomen soft [Non Tender] : non-tender [Non-distended] : non-distended [No HSM] : no HSM [Normal Bowel Sounds] : normal bowel sounds [No CVA Tenderness] : no CVA  tenderness [No Joint Swelling] : no joint swelling [No Rash] : no rash [Coordination Grossly Intact] : coordination grossly intact [No Focal Deficits] : no focal deficits [Normal Gait] : normal gait [de-identified] : Depressed

## 2024-06-03 NOTE — HISTORY OF PRESENT ILLNESS
[FreeTextEntry1] : Establish care [de-identified] : A 48yo woman, previously healthy, presented for initial appointment to establish care.

## 2024-06-03 NOTE — REVIEW OF SYSTEMS
[Constipation] : constipation [Depression] : depression [Negative] : Neurological [Abdominal Pain] : no abdominal pain [Nausea] : no nausea [Diarrhea] : diarrhea [Vomiting] : no vomiting [Heartburn] : no heartburn [Melena] : no melena [de-identified] : White spots on forearm

## 2024-06-03 NOTE — ASSESSMENT
[FreeTextEntry1] : A 48yo woman, previously healthy, presented for initial appointment to establish care.   #Depression #Menopause sx (hot flashes, palpitations) - No Suicide/homocide ideation, hx of postpartum depression. Denies darshan/hypomania episodes - Started on citoles 20mg daily 3 weeks ago by gyn - Reports improvement (7/10) of depressive sx with citalopram - No improvement in menopausal sx - Reports debilitating fatigue and sleepiness on citalopram and needing to take half a tab to be able to wake up. - FU with gyn for menopausal sx - Suggest gyn switch citalopram to a different SSRI after 2-3 weeks if side effects persist.  #elevated BP without dx of HTN - /88 --> 142/89 - Lifestyle modification, low salt/DASH diet, exercise.  #HCM - Vaccination: COVID UTD, refused other vaccines at the moment - CRC screening: colonoscopy 18005): normal. Repeat in 10y - Mammogram (5/30/24): BIRADs 2 - repeat yearly - PAP smear + HPV neg (5/13/24) - FU blood work - FU in

## 2024-06-05 DIAGNOSIS — Z00.00 ENCOUNTER FOR GENERAL ADULT MEDICAL EXAMINATION WITHOUT ABNORMAL FINDINGS: ICD-10-CM

## 2024-06-05 DIAGNOSIS — F32.A DEPRESSION, UNSPECIFIED: ICD-10-CM

## 2024-06-05 DIAGNOSIS — R03.0 ELEVATED BLOOD-PRESSURE READING, WITHOUT DIAGNOSIS OF HYPERTENSION: ICD-10-CM

## 2024-07-29 ENCOUNTER — APPOINTMENT (OUTPATIENT)
Dept: OBGYN | Facility: CLINIC | Age: 49
End: 2024-07-29

## 2024-07-29 ENCOUNTER — OUTPATIENT (OUTPATIENT)
Dept: OUTPATIENT SERVICES | Facility: HOSPITAL | Age: 49
LOS: 1 days | End: 2024-07-29
Payer: COMMERCIAL

## 2024-07-29 DIAGNOSIS — Z71.2 PERSON CONSULTING FOR EXPLANATION OF EXAMINATION OR TEST FINDINGS: ICD-10-CM

## 2024-07-29 DIAGNOSIS — F32.A DEPRESSION, UNSPECIFIED: ICD-10-CM

## 2024-07-29 PROCEDURE — 99203 OFFICE O/P NEW LOW 30 MIN: CPT | Mod: 95

## 2024-07-29 PROCEDURE — 99213 OFFICE O/P EST LOW 20 MIN: CPT

## 2024-07-29 RX ORDER — VENLAFAXINE HYDROCHLORIDE 75 MG/1
75 CAPSULE, EXTENDED RELEASE ORAL DAILY
Qty: 30 | Refills: 11 | Status: ACTIVE | COMMUNITY
Start: 2024-07-29 | End: 1900-01-01

## 2024-07-29 NOTE — HISTORY OF PRESENT ILLNESS
[Home] : at home, [unfilled] , at the time of the visit. [Medical Office: ([facility])___] : at the medical office located in  [Verbal consent obtained from patient] : the patient, [unfilled] [FreeTextEntry1] : Patient reports that she is not feeling better with the citalopram. She also reports that the medication is making her sleepy.

## 2024-07-30 DIAGNOSIS — F32.A DEPRESSION, UNSPECIFIED: ICD-10-CM

## 2024-09-09 ENCOUNTER — APPOINTMENT (OUTPATIENT)
Dept: INTERNAL MEDICINE | Facility: CLINIC | Age: 49
End: 2024-09-09

## 2024-09-09 ENCOUNTER — APPOINTMENT (OUTPATIENT)
Dept: OBGYN | Facility: CLINIC | Age: 49
End: 2024-09-09
Payer: COMMERCIAL

## 2024-09-09 ENCOUNTER — OUTPATIENT (OUTPATIENT)
Dept: OUTPATIENT SERVICES | Facility: HOSPITAL | Age: 49
LOS: 1 days | End: 2024-09-09
Payer: COMMERCIAL

## 2024-09-09 DIAGNOSIS — N95.1 MENOPAUSAL AND FEMALE CLIMACTERIC STATES: ICD-10-CM

## 2024-09-09 DIAGNOSIS — Z00.00 ENCOUNTER FOR GENERAL ADULT MEDICAL EXAMINATION WITHOUT ABNORMAL FINDINGS: ICD-10-CM

## 2024-09-09 PROCEDURE — 99213 OFFICE O/P EST LOW 20 MIN: CPT | Mod: 95

## 2024-09-09 PROCEDURE — 99213 OFFICE O/P EST LOW 20 MIN: CPT

## 2024-09-09 RX ORDER — ESTRADIOL AND NORETHINDRONE ACETATE .5; .1 MG/1; MG/1
0.5-0.1 TABLET, FILM COATED ORAL DAILY
Qty: 3 | Refills: 3 | Status: ACTIVE | COMMUNITY
Start: 2024-09-09 | End: 1900-01-01

## 2024-09-10 NOTE — HISTORY OF PRESENT ILLNESS
[FreeTextEntry1] : Patient reports that she is still not feeling great. She is having hot flashes and she has mild depression. Her hot flashes are most borthersome. She is currently taking Venlafaxine.

## 2024-09-10 NOTE — PLAN
[FreeTextEntry1] : D/C SSRI and start HRT Patient counselled about the risks and benefits. She was also recommended to make an appointment with psych.

## 2024-09-10 NOTE — REASON FOR VISIT
[Home] : at home, [unfilled] , at the time of the visit. [Medical Office: ([facility])___] : at the medical office located in

## 2024-12-22 ENCOUNTER — EMERGENCY (EMERGENCY)
Facility: HOSPITAL | Age: 49
LOS: 0 days | Discharge: HOME | End: 2024-12-22
Attending: EMERGENCY MEDICINE
Payer: MEDICAID

## 2024-12-22 VITALS
OXYGEN SATURATION: 100 % | WEIGHT: 134.92 LBS | TEMPERATURE: 98 F | HEART RATE: 63 BPM | RESPIRATION RATE: 18 BRPM | SYSTOLIC BLOOD PRESSURE: 126 MMHG | DIASTOLIC BLOOD PRESSURE: 84 MMHG

## 2024-12-22 DIAGNOSIS — M79.675 PAIN IN LEFT TOE(S): ICD-10-CM
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
## 2024-12-22 DIAGNOSIS — S92.532A DISPLACED FRACTURE OF DISTAL PHALANX OF LEFT LESSER TOE(S), INITIAL ENCOUNTER FOR CLOSED FRACTURE: ICD-10-CM
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
## 2024-12-22 PROCEDURE — 73630 X-RAY EXAM OF FOOT: CPT | Mod: 26,LT

## 2024-12-22 PROCEDURE — 99284 EMERGENCY DEPT VISIT MOD MDM: CPT

## 2024-12-22 PROCEDURE — 99283 EMERGENCY DEPT VISIT LOW MDM: CPT | Mod: 25

## 2024-12-22 PROCEDURE — 73630 X-RAY EXAM OF FOOT: CPT | Mod: LT

## 2024-12-22 RX ORDER — IBUPROFEN 200 MG
600 TABLET ORAL ONCE
Refills: 0 | Status: COMPLETED | OUTPATIENT
Start: 2024-12-22 | End: 2024-12-22

## 2024-12-22 RX ADMIN — Medication 600 MILLIGRAM(S): at 14:56

## 2024-12-22 NOTE — ED PROVIDER NOTE - PHYSICAL EXAMINATION
VITAL SIGNS: I have reviewed nursing notes and confirm.  CONSTITUTIONAL:  in no acute distress.  SKIN: Skin exam is warm and dry, no acute rash.  CARD: S1, S2 normal; no murmurs, gallops, or rubs. Regular rate and rhythm.  RESP: No wheezes, rales or rhonchi. Speaking in full sentences.   ABD: soft; non-distended; non-tender; No rebound or guarding.  EXT: swelling and ttp of left 2nd toe

## 2024-12-22 NOTE — ED ADULT NURSE NOTE - NS ED NURSE LEVEL OF CONSCIOUSNESS MENTAL STATUS
Patient:  Shannon Sow  :   1978  MRN:     [id number]        Ms.Jessica Abby Sow  [address] [place] [address]        September 15, 2021    Dear Shannon    Here are your labs which show normal vitamin D, normal TSH, slightly higher glucose (328), slightly lower sodium which could be related to the slightly higher glucose, and a hemoglobin A1c of 7.4.  Overall, I think they are better.    If you have any questions, please feel free to contact my nurse at [phone] select option #3 for triage nurse  or  option #1 for scheduling related questions.    Regards    Jemma Anderson MD       Resulted Orders   25 Hydroxyvitamin D2 and D3   Result Value Ref Range    25 OH Vitamin D2 <5 ug/L    25 OH Vitamin D3 30 ug/L    25 OH Vit D Total <35 20 - 75 ug/L      Comment:      Season, race, dietary intake, and treatment affect the concentration of 25-hydroxy-Vitamin D. Values may decrease during winter months and increase during summer months. Values 20-29 ug/L may indicate Vitamin D insufficiency and values <20 ug/L may indicate Vitamin D deficiency.    Narrative    This test was developed and its performance characteristics determined by the [facility],  Special Chemistry Laboratory. It has not been cleared or approved by the FDA. The laboratory is regulated under CLIA as qualified to perform high-complexity testing. This test is used for clinical purposes. It should not be regarded as investigational or for research.   TSH   Result Value Ref Range    TSH 0.44 0.30 - 5.00 uIU/mL   T4 free   Result Value Ref Range    Free T4 0.84 0.70 - 1.80 ng/dL      Comment:      Performance of the Free T4 test has not been established with  specimens (<= 2 months of age).     Hemoglobin A1c   Result Value Ref Range    Hemoglobin A1C 7.4 (H) 0.0 - 5.6 %      Comment:      Normal <5.7%   Prediabetes 5.7-6.4%    Diabetes 6.5% or higher     Note: Adopted from ADA consensus  guidelines.   Basic metabolic panel   Result Value Ref Range    Sodium 135 (L) 136 - 145 mmol/L    Potassium 4.4 3.5 - 5.0 mmol/L    Chloride 101 98 - 107 mmol/L    Carbon Dioxide (CO2) 24 22 - 31 mmol/L    Anion Gap 10 5 - 18 mmol/L    Urea Nitrogen 13 8 - 22 mg/dL    Creatinine 0.95 0.60 - 1.10 mg/dL    Calcium 9.1 8.5 - 10.5 mg/dL    Glucose 328 (H) 70 - 125 mg/dL    GFR Estimate 74 >60 mL/min/1.73m2      Comment:      As of July 11, 2021, eGFR is calculated by the CKD-EPI creatinine equation, without race adjustment. eGFR can be influenced by muscle mass, exercise, and diet. The reported eGFR is an estimation only and is only applicable if the renal function is stable.       [facility]       Awake/Alert

## 2024-12-22 NOTE — ED PROVIDER NOTE - CLINICAL SUMMARY MEDICAL DECISION MAKING FREE TEXT BOX
Patient presenting for evaluation of left second toe pain status post injury yesterday.  Ambulatory.  No other injuries or acute complaints.  Well appearing, NAD, non toxic.  2+ equal pulses, less than twos and cap refill.  Tenderness palpation to left second toe..  Comfortable with discharge and follow-up outpatient, strict return precautions given. Endorses understanding of all of this and aware that they can return at any time for new or concerning symptoms. No further questions or concerns at this time

## 2024-12-22 NOTE — ED PROVIDER NOTE - CARE PROVIDER_API CALL
Harry Alejandre  Podiatric Medicine and Surgery  [address], 1st [address]  [place] [address]  Phone: [phone]  Fax: [phone]  Follow Up Time:

## 2024-12-22 NOTE — ED PROVIDER NOTE - NSFOLLOWUPINSTRUCTIONS_ED_ALL_ED_FT
Please follow up with podiatry in 1-3 days     ****** Our Emergency Department Referral Coordinators will be reaching out to you in the next 24-48 hours from 9:00am to 5:00pm with a follow up appointment. Please expect a phone call from the hospital in that time frame. If you do not receive a call or if you have any questions or concerns, you can reach them at [phone]     Toe Fracture    WHAT YOU NEED TO KNOW:    A toe fracture is a break in 1 or more of the bones in your toe. It is most commonly caused by a direct blow to the toe. The bones in your toe may just be broken, or they may be out of place or . Foot Anatomy         DISCHARGE INSTRUCTIONS:    Return to the emergency department if:     Blood soaks through your bandage.      You have severe pain in your toe.      Your toe is cold or numb.    Contact your healthcare provider if:     You have a fever.      Your pain does not go away, even after treatment.      Your toe continues to hurt even after it has healed.      You have questions or concerns about your condition or care.    Medicines: You may need any of the following:     NSAIDs, such as ibuprofen, help decrease swelling, pain, and fever. This medicine is available with or without a doctor's order. NSAIDs can cause stomach bleeding or kidney problems in certain people. If you take blood thinner medicine, always ask your healthcare provider if NSAIDs are safe for you. Always read the medicine label and follow directions.      Prescription pain medicine may be given. Ask your healthcare provider how to take this medicine safely. Some prescription pain medicines contain acetaminophen. Do not take other medicines that contain acetaminophen without talking to your healthcare provider. Too much acetaminophen may cause liver damage. Prescription pain medicine may cause constipation. Ask your healthcare provider how to prevent or treat constipation.       Antibiotics treat a bacterial infection. You may need antibiotics if you have an open wound.      Take your medicine as directed. Contact your healthcare provider if you think your medicine is not helping or if you have side effects. Tell him of her if you are allergic to any medicine. Keep a list of the medicines, vitamins, and herbs you take. Include the amounts, and when and why you take them. Bring the list or the pill bottles to follow-up visits. Carry your medicine list with you in case of an emergency.    Self-care:     Use roni tape, an elastic bandage, or a splint as directed. These help keep your toe in its correct position as it heals. Roni tape is when your fractured toe and the toe next to it are taped together.       Use support devices including a cane, crutches, walking boot, or hard soled shoe as directed. These help protect your broken toe and limit movement so it can heal.Walking Boot           Rest your toe so that it can heal. Return to normal activities as directed.      Apply ice on your toe for 15 to 20 minutes every hour or as directed. Use an ice pack, or put crushed ice in a plastic bag. Cover it with a towel. Ice helps prevent tissue damage and decreases swelling and pain.      Elevate your toe above the level of your heart as often as you can. This will help decrease swelling and pain. Prop your toe on pillows or blankets to keep it elevated comfortably.     Follow up with your healthcare provider as directed: You may need to see a podiatrist in 1 to 2 weeks. Write down your questions so you remember to ask them during your visits.        © Copyright [employer] 2019 All illustrations and images included in CareNotes are the copyrighted property of A.D.A[employer]M., Inc[employer] or [employer]

## 2024-12-22 NOTE — ED ADULT NURSE NOTE - OBJECTIVE STATEMENT
Pt was playing with her son, hurt her second metacarpal on the left foot. Toe is edematous and ecchymotic. Pt reports pain with flexion. No overt deformity noted.

## 2024-12-22 NOTE — ED PROVIDER NOTE - PROGRESS NOTE DETAILS
pk pre attending note: 49-year-old female no medical history presenting to ED for evaluation of left second toe pain. exam demonstrates pain ecchymosis to 2nd digit of left foot, sensation rom intact, no other tenderness to lle extremity, xray demonstrates distal 2nd digit fx, roni taped and podiatry f.u given. all results d/w pt & copies given, strict return precautions discussed, rec outpt f.u with podiatry. Patient agreeable with plan and demonstrates understanding.

## 2024-12-22 NOTE — ED PROVIDER NOTE - PATIENT PORTAL LINK FT
You can access the FollowMyHealth Patient Portal offered by [facility] by registering at the following website: [email or web address]. By joining [employer]’s FollowMyHealth portal, you will also be able to view your health information using other applications (apps) compatible with our system.

## 2024-12-26 ENCOUNTER — APPOINTMENT (OUTPATIENT)
Dept: PODIATRY | Facility: CLINIC | Age: 49
End: 2024-12-26
Payer: SELF-PAY

## 2024-12-26 ENCOUNTER — OUTPATIENT (OUTPATIENT)
Dept: OUTPATIENT SERVICES | Facility: HOSPITAL | Age: 49
LOS: 1 days | End: 2024-12-26
Payer: COMMERCIAL

## 2024-12-26 DIAGNOSIS — M79.672 PAIN IN LEFT FOOT: ICD-10-CM

## 2024-12-26 DIAGNOSIS — Z00.00 ENCOUNTER FOR GENERAL ADULT MEDICAL EXAMINATION WITHOUT ABNORMAL FINDINGS: ICD-10-CM

## 2024-12-26 PROCEDURE — 99203 OFFICE O/P NEW LOW 30 MIN: CPT

## 2024-12-26 RX ORDER — MELOXICAM 15 MG/1
15 TABLET ORAL DAILY
Qty: 14 | Refills: 0 | Status: ACTIVE | COMMUNITY
Start: 2024-12-26 | End: 1900-01-01
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
## 2025-01-03 DIAGNOSIS — M79.672 PAIN IN LEFT FOOT: ICD-10-CM

## 2025-01-24 ENCOUNTER — EMERGENCY (EMERGENCY)
Facility: HOSPITAL | Age: 50
LOS: 0 days | Discharge: HOME | End: 2025-01-24
Attending: EMERGENCY MEDICINE
Payer: MEDICAID

## 2025-01-24 VITALS
HEART RATE: 90 BPM | WEIGHT: 138.01 LBS | RESPIRATION RATE: 18 BRPM | OXYGEN SATURATION: 99 % | TEMPERATURE: 98 F | DIASTOLIC BLOOD PRESSURE: 85 MMHG | SYSTOLIC BLOOD PRESSURE: 175 MMHG

## 2025-01-24 VITALS
HEART RATE: 72 BPM | DIASTOLIC BLOOD PRESSURE: 88 MMHG | TEMPERATURE: 97 F | OXYGEN SATURATION: 99 % | SYSTOLIC BLOOD PRESSURE: 147 MMHG | RESPIRATION RATE: 16 BRPM

## 2025-01-24 DIAGNOSIS — R05.2 SUBACUTE COUGH: ICD-10-CM

## 2025-01-24 DIAGNOSIS — E03.9 HYPOTHYROIDISM, UNSPECIFIED: ICD-10-CM

## 2025-01-24 DIAGNOSIS — J40 BRONCHITIS, NOT SPECIFIED AS ACUTE OR CHRONIC: ICD-10-CM

## 2025-01-24 DIAGNOSIS — R11.2 NAUSEA WITH VOMITING, UNSPECIFIED: ICD-10-CM

## 2025-01-24 DIAGNOSIS — R07.89 OTHER CHEST PAIN: ICD-10-CM

## 2025-01-24 LAB
ALBUMIN SERPL ELPH-MCNC: 4.9 G/DL — SIGNIFICANT CHANGE UP (ref 3.5–5.2)
ALP SERPL-CCNC: 154 U/L — HIGH (ref 30–115)
ALT FLD-CCNC: 37 U/L — SIGNIFICANT CHANGE UP (ref 0–41)
ANION GAP SERPL CALC-SCNC: 11 MMOL/L — SIGNIFICANT CHANGE UP (ref 7–14)
AST SERPL-CCNC: 32 U/L — SIGNIFICANT CHANGE UP (ref 0–41)
BASOPHILS # BLD AUTO: 0.04 K/UL — SIGNIFICANT CHANGE UP (ref 0–0.2)
BASOPHILS NFR BLD AUTO: 0.7 % — SIGNIFICANT CHANGE UP (ref 0–1)
BILIRUB SERPL-MCNC: 0.4 MG/DL — SIGNIFICANT CHANGE UP (ref 0.2–1.2)
BUN SERPL-MCNC: 8 MG/DL — LOW (ref 10–20)
CALCIUM SERPL-MCNC: 9.4 MG/DL — SIGNIFICANT CHANGE UP (ref 8.4–10.5)
CHLORIDE SERPL-SCNC: 98 MMOL/L — SIGNIFICANT CHANGE UP (ref 98–110)
CO2 SERPL-SCNC: 26 MMOL/L — SIGNIFICANT CHANGE UP (ref 17–32)
CREAT SERPL-MCNC: 0.6 MG/DL — LOW (ref 0.7–1.5)
EGFR: 109 ML/MIN/1.73M2 — SIGNIFICANT CHANGE UP
EOSINOPHIL # BLD AUTO: 0.13 K/UL — SIGNIFICANT CHANGE UP (ref 0–0.7)
EOSINOPHIL NFR BLD AUTO: 2.3 % — SIGNIFICANT CHANGE UP (ref 0–8)
GLUCOSE SERPL-MCNC: 101 MG/DL — HIGH (ref 70–99)
HCT VFR BLD CALC: 39.3 % — SIGNIFICANT CHANGE UP (ref 37–47)
HGB BLD-MCNC: 13.1 G/DL — SIGNIFICANT CHANGE UP (ref 12–16)
IMM GRANULOCYTES NFR BLD AUTO: 0.3 % — SIGNIFICANT CHANGE UP (ref 0.1–0.3)
LYMPHOCYTES # BLD AUTO: 1.5 K/UL — SIGNIFICANT CHANGE UP (ref 1.2–3.4)
LYMPHOCYTES # BLD AUTO: 26.1 % — SIGNIFICANT CHANGE UP (ref 20.5–51.1)
MAGNESIUM SERPL-MCNC: 1.9 MG/DL — SIGNIFICANT CHANGE UP (ref 1.8–2.4)
MCHC RBC-ENTMCNC: 29.1 PG — SIGNIFICANT CHANGE UP (ref 27–31)
MCHC RBC-ENTMCNC: 33.3 G/DL — SIGNIFICANT CHANGE UP (ref 32–37)
MCV RBC AUTO: 87.3 FL — SIGNIFICANT CHANGE UP (ref 81–99)
MONOCYTES # BLD AUTO: 0.37 K/UL — SIGNIFICANT CHANGE UP (ref 0.1–0.6)
MONOCYTES NFR BLD AUTO: 6.4 % — SIGNIFICANT CHANGE UP (ref 1.7–9.3)
NEUTROPHILS # BLD AUTO: 3.69 K/UL — SIGNIFICANT CHANGE UP (ref 1.4–6.5)
NEUTROPHILS NFR BLD AUTO: 64.2 % — SIGNIFICANT CHANGE UP (ref 42.2–75.2)
NRBC # BLD: 0 /100 WBCS — SIGNIFICANT CHANGE UP (ref 0–0)
PLATELET # BLD AUTO: 270 K/UL — SIGNIFICANT CHANGE UP (ref 130–400)
PMV BLD: 11.1 FL — HIGH (ref 7.4–10.4)
POTASSIUM SERPL-MCNC: 3.6 MMOL/L — SIGNIFICANT CHANGE UP (ref 3.5–5)
POTASSIUM SERPL-SCNC: 3.6 MMOL/L — SIGNIFICANT CHANGE UP (ref 3.5–5)
PROT SERPL-MCNC: 7.1 G/DL — SIGNIFICANT CHANGE UP (ref 6–8)
RBC # BLD: 4.5 M/UL — SIGNIFICANT CHANGE UP (ref 4.2–5.4)
RBC # FLD: 12.8 % — SIGNIFICANT CHANGE UP (ref 11.5–14.5)
SODIUM SERPL-SCNC: 135 MMOL/L — SIGNIFICANT CHANGE UP (ref 135–146)
WBC # BLD: 5.75 K/UL — SIGNIFICANT CHANGE UP (ref 4.8–10.8)
WBC # FLD AUTO: 5.75 K/UL — SIGNIFICANT CHANGE UP (ref 4.8–10.8)

## 2025-01-24 PROCEDURE — 71046 X-RAY EXAM CHEST 2 VIEWS: CPT | Mod: 26

## 2025-01-24 PROCEDURE — 99284 EMERGENCY DEPT VISIT MOD MDM: CPT

## 2025-01-24 PROCEDURE — 96375 TX/PRO/DX INJ NEW DRUG ADDON: CPT

## 2025-01-24 PROCEDURE — 85025 COMPLETE CBC W/AUTO DIFF WBC: CPT

## 2025-01-24 PROCEDURE — 80053 COMPREHEN METABOLIC PANEL: CPT

## 2025-01-24 PROCEDURE — 36415 COLL VENOUS BLD VENIPUNCTURE: CPT

## 2025-01-24 PROCEDURE — 96374 THER/PROPH/DIAG INJ IV PUSH: CPT

## 2025-01-24 PROCEDURE — 99284 EMERGENCY DEPT VISIT MOD MDM: CPT | Mod: 25

## 2025-01-24 PROCEDURE — 94640 AIRWAY INHALATION TREATMENT: CPT

## 2025-01-24 PROCEDURE — 71046 X-RAY EXAM CHEST 2 VIEWS: CPT

## 2025-01-24 PROCEDURE — 83735 ASSAY OF MAGNESIUM: CPT

## 2025-01-24 RX ORDER — SODIUM CHLORIDE 9 MG/ML
1000 INJECTION, SOLUTION INTRAMUSCULAR; INTRAVENOUS; SUBCUTANEOUS ONCE
Refills: 0 | Status: COMPLETED | OUTPATIENT
Start: 2025-01-24 | End: 2025-01-24

## 2025-01-24 RX ORDER — METOCLOPRAMIDE 10 MG/1
10 TABLET ORAL ONCE
Refills: 0 | Status: COMPLETED | OUTPATIENT
Start: 2025-01-24 | End: 2025-01-24

## 2025-01-24 RX ORDER — ALBUTEROL SULFATE 90 UG/1
2 INHALANT RESPIRATORY (INHALATION) EVERY 6 HOURS
Refills: 0 | Status: DISCONTINUED | OUTPATIENT
Start: 2025-01-24 | End: 2025-01-24

## 2025-01-24 RX ORDER — ACETAMINOPHEN 80 MG/.8ML
975 SOLUTION/ DROPS ORAL ONCE
Refills: 0 | Status: COMPLETED | OUTPATIENT
Start: 2025-01-24 | End: 2025-01-24

## 2025-01-24 RX ORDER — KETOROLAC TROMETHAMINE 30 MG/ML
15 INJECTION INTRAMUSCULAR; INTRAVENOUS ONCE
Refills: 0 | Status: DISCONTINUED | OUTPATIENT
Start: 2025-01-24 | End: 2025-01-24

## 2025-01-24 RX ORDER — IPRATROPIUM BROMIDE AND ALBUTEROL SULFATE .5; 2.5 MG/3ML; MG/3ML
3 SOLUTION RESPIRATORY (INHALATION) ONCE
Refills: 0 | Status: COMPLETED | OUTPATIENT
Start: 2025-01-24 | End: 2025-01-24

## 2025-01-24 RX ORDER — DEXAMETHASONE SODIUM PHOSPHATE 4 MG/ML
10 VIAL (ML) INJECTION ONCE
Refills: 0 | Status: COMPLETED | OUTPATIENT
Start: 2025-01-24 | End: 2025-01-24

## 2025-01-24 RX ADMIN — KETOROLAC TROMETHAMINE 15 MILLIGRAM(S): 30 INJECTION INTRAMUSCULAR; INTRAVENOUS at 21:16

## 2025-01-24 RX ADMIN — ACETAMINOPHEN 975 MILLIGRAM(S): 80 SOLUTION/ DROPS ORAL at 22:57

## 2025-01-24 RX ADMIN — Medication 10 MILLIGRAM(S): at 22:25

## 2025-01-24 RX ADMIN — SODIUM CHLORIDE 2000 MILLILITER(S): 9 INJECTION, SOLUTION INTRAMUSCULAR; INTRAVENOUS; SUBCUTANEOUS at 21:17

## 2025-01-24 RX ADMIN — METOCLOPRAMIDE 104 MILLIGRAM(S): 10 TABLET ORAL at 21:15

## 2025-01-24 RX ADMIN — IPRATROPIUM BROMIDE AND ALBUTEROL SULFATE 3 MILLILITER(S): .5; 2.5 SOLUTION RESPIRATORY (INHALATION) at 22:25

## 2025-01-24 NOTE — ED ADULT NURSE NOTE - NS ED NURSE LEVEL OF CONSCIOUSNESS ORIENTATION
HISTORY OF PRESENT ILLNESS: Yisel is a 51 year old,       female who presents today in referral from Valerie Mcneil CNM for bleeding concerns.    Over the last 5 years her cycles have slowly changed, would come \"twice a month\", heavier and longer. Her last 2 cycles were 3-4 weeks of bleeding and then 1 week of no bleeding and then bleeding would restart. She is bleeding again now after only 1 week off after bleeding 4 weeks prior. She uses tampons and can soak one in a few hours \"at the maximum\". She is not having pain. Cycles became so long starting last summer, about 8 months ago.    She has more stress, adopted 6 years ago and has 3 teens at home. Otherwise she reports no changes in diet, illness, exercise, etc.    \"Emotionally, hormonally I am off the wall\".    Her  has had vasectomy.    CBC was normal a few weeks ago. She hasn't had thyroid testing.  Pap smear is up-to-date, normal with negative HPV testing 2019.    She did have vasomotor symptoms about 7 months ago, but not anymore since starting the abnormal bleeding.    She denies any family history of female GYN cancers.    Pelvic US 2 weeks ago showed:  The uterus contains fibroids as described (2 small fibroids). The cervix appears normal. The endometrium appears somewhat thickened,  hyperechoic, and there are scattered tiny cystic spaces noted in the tissue.  There is no abnormal accumulation of fluid in the cul-de-sac.  The left ovary appears normal. The right ovary contains a 3.2 cm simple appearing cyst.    I have reviewed the patient's medications and allergies, and past medical, surgical, social, family and obstetrical histories, updating these as appropriate.  See Histories section of the electronic medical record for a display of this information.    Physical exam:   Visit Vitals  /76 (BP Location: LUE - Left upper extremity, Patient Position: Sitting, Cuff Size: Regular)   Ht 5' 6\" (1.676 m)   Wt 78.3 kg (172 lb 9.9 oz)    LMP 01/20/2022 (Approximate)   BMI 27.86 kg/m²     GENERAL:  Well developed, well nourished, appropriately groomed.  ABDOMEN:  Soft, nontender, nondistended.  Bowel sounds present.  No hepatosplenomegaly noted.  PSYCHIATRIC:   Alert, oriented to person, place and time.  Appropriate affect and mood.  GYNECOLOGIC:  Normal female external genitalia and hair distribution.  Urethra midline with no masses.  Bladder and rectum are nontender.  There is a small amount of dark liquid blood currently in the vaginal vault.  No cervical or vaginal lesions.  Vaginal tissue is still relatively well estrogenized.  Good pelvic support with no evidence of prolapse, mild generalized relaxation.     Need for endometrial biopsy was discussed with the patient and informed consent obtained. Speculum was inserted and the cervix visualized and cleansed with Betadine. The anterior lip of the cervix was grasped with a tenaculum. An endometrial biopsy Pipelle was then attempted to be passed but resistance met at the internal os.  A flexible, taper tip dilator was used to identify and dilate the path and the Pipelle was then able to be inserted through the cervix into the uterine cavity, which sounded to 9cm. Biopsy specimen was obtained (2 passes taken, Pipelle mostly filling with blood with pass #1) without difficulty. All instruments were removed and the patient was noted to be hemostatic. She had tolerated biopsy well and was stable following.    Assessment & Plan:   51-year-old female with history of heavy, prolonged, irregular menstrual bleeding for approximately the last 8 months.    Endometrial biopsy is performed today.  As noted above, her recent CBC was normal but she has not had thyroid screening, which is also ordered.  Pelvic ultrasound revealed a thickened, cystic-appearing endometrium.  We discussed the potential of something like an endometrial polyp but EMB performed today to rule out hyperplasia/carcinoma.    We discussed  both medical and surgical treatment options for her bleeding.  It is very likely the bleeding changes are due to perimenopausal hormonal changes but also she has endometrial findings that may be contributing.  I think that the endometrial findings may need to be surgically treated regardless of her long-term treatment decision.  We discussed progesterone only medical options (due to her history of hypertension) such as pills, shot, implant, IUD, Lysteda, endometrial ablation, and definitive hysterectomy.  She is fearful that a medical or minor surgical treatment would not definitively take care of these issues and really feels that she has already considered her options and desires definitive therapy with hysterectomy.  We discussed ovarian removal versus conservation and she believes she would also like ovarian removal (2 small simple appearing cysts were noted on her left ovary on imaging, thought to be benign) to avoid the potential for any future issues.  She is therefore scheduled today for total laparoscopic hysterectomy, BSO, and cystoscopy at her request.    Risks of surgery are discussed today including but not limited to bleeding with risk of blood transfusion, infection, damage to surrounding structures such as bowel, urinary tract, blood vessels, nerves, etc., conversion to an open procedure, vaginal cuff dehiscence, postoperative medical sequelae, and death.  Informed consent is obtained.    Postoperative restrictions and expectations are reviewed.    Due to her underlying hypertension, preoperative clearance will be requested from her PCP.    She denies additional questions today and is comfortable with the current plan pending normal results from today's visit and preoperative clearance.    Valerie, thank you for referral of this patient. If you have any questions regarding her care, please do not hesitate to contact me.    Greater than 40 minutes is spent in total patient care today.     Oriented - self; Oriented - place; Oriented - time

## 2025-01-24 NOTE — ED PROVIDER NOTE - PHYSICAL EXAMINATION
CONSTITUTIONAL: well nourished, in mild distress  HEAD: NCAT  EYES: EOMI, no scleral icterus  ENT: Dry mucous membranes  NECK: Full ROM.   CARD: RRR  RESP: Right basilar rales  ABD: soft, non-tender, No CVA tenderness  EXT: Full ROM  NEURO: normal motor. normal sensory. Normal gait.  PSYCH: Cooperative, appropriate.

## 2025-01-24 NOTE — ED PROVIDER NOTE - OBJECTIVE STATEMENT
50-year-old female with history of hypothyroidism and bronchitis presents for evaluation of headache x 1 day.  Patient also has had a dry cough for the past 2 weeks with associated nausea without vomiting.  Her sister also had a dry cough that resolved. She has not had any recent fevers, chills, sweats, chest pain, shortness of breath, abdominal pain, diarrhea, constipation, urinary symptoms, numbness or tingling.

## 2025-01-24 NOTE — ED PROVIDER NOTE - NSFOLLOWUPINSTRUCTIONS_ED_ALL_ED_FT
Follow up with your primary doctor    Headache    A headache is pain or discomfort felt around the head or neck area. The specific cause of a headache may not be found as there are many types including tension headaches, migraine headaches, and cluster headaches. Watch your condition for any changes. Things you can do to manage your pain include taking over the counter and prescription medications as instructed by your health care provider, lying down in a dark quiet room, limiting stress, getting regular sleep, and refraining from alcohol and tobacco products.    SEEK IMMEDIATE MEDICAL CARE IF YOU HAVE ANY OF THE FOLLOWING SYMPTOMS: fever, vomiting, stiff neck, loss of vision, problems with speech, muscle weakness, loss of balance, trouble walking, passing out, or confusion.    Bronchitis patient education includes learning about the symptoms, causes, and treatments for bronchitis. Bronchitis is an inflammation of the bronchial tubes that can cause a cough and difficulty breathing.   Symptoms   Coughing  Difficulty breathing  Shortness of breath  Fever  Chills and sweats  Coughing up bloody or rust-colored phlegm  Causes   Cold and flu viruses  Bacteria  Breathing in irritants like smoke, dust, pollen, fumes, and air pollution  Treatments  Rest: Get enough sleep and rest to help your body heal   Fluids: Drink plenty of fluids to thin mucus and avoid dehydration   Humidifier: Use a humidifier to breathe in moist air and loosen mucus   Cough medicine: Take an over-the-counter cough suppressant or expectorant as recommended by your doctor   Pain relievers: Take acetaminophen or ibuprofen to relieve fever and body aches   Cough drops: Suck on cough drops or hard candies to soothe a sore throat   Steam: Breathe in steam from a hot shower or sink filled with hot water   Supplements: Consider supplements like marquis, garlic, turmeric, thyme, eucalyptus, and primrose

## 2025-01-24 NOTE — ED PROVIDER NOTE - ATTENDING CONTRIBUTION TO CARE
51 yo f with no pmh presents with headache today.  pt admits also has had cough x 2 weeks, dry.  pt denies any focal neuro sx.  no slurred speech, facial droop, dizziness, vision changes.  pt says she took tylenol today and the headache did not resolve.  no fever or chills.  pt admits mild chest discomfort while coughing.  exam: nad, ncat, perrl, eomi, mmm, rrr, ctab, abd soft, nt, nd aox3, no calf tenderness, no pitting edema, aox3, cn2-12 normal, 5/5 strength all ext, sensation intact, finger to nose normal, romberg neg, prontator drift neg, gait normal imp: pt with headache x 1 day, symptomatic tx.  cxr

## 2025-01-24 NOTE — ED PROVIDER NOTE - CLINICAL SUMMARY MEDICAL DECISION MAKING FREE TEXT BOX
ED CXR film, my independent interpretation - Dr. Liliam Landon, attending physician: No significant cardiopulmonary abnormalities, no ptx, no rib fractures ED CXR film, my independent interpretation - Dr. Liliam Landon, attending physician: No significant cardiopulmonary abnormalities, no ptx, no rib fractures    Pt with cough x 2 weeks and today with headache, likely bronchitis.  symptomatic tx, feeling better.  pt to f/u with pmd outpt,

## 2025-01-24 NOTE — ED PROVIDER NOTE - PATIENT PORTAL LINK FT
You can access the FollowMyHealth Patient Portal offered by [facility] by registering at the following website: [email or web address]. By joining [employer]’s FollowMyHealth portal, you will also be able to view your health information using other applications (apps) compatible with our system.

## 2025-01-29 ENCOUNTER — OUTPATIENT (OUTPATIENT)
Dept: OUTPATIENT SERVICES | Facility: HOSPITAL | Age: 50
LOS: 1 days | End: 2025-01-29
Payer: COMMERCIAL

## 2025-01-29 ENCOUNTER — APPOINTMENT (OUTPATIENT)
Dept: PODIATRY | Facility: CLINIC | Age: 50
End: 2025-01-29
Payer: COMMERCIAL

## 2025-01-29 DIAGNOSIS — B35.1 TINEA UNGUIUM: ICD-10-CM

## 2025-01-29 DIAGNOSIS — M79.672 PAIN IN LEFT FOOT: ICD-10-CM

## 2025-01-29 DIAGNOSIS — Z00.00 ENCOUNTER FOR GENERAL ADULT MEDICAL EXAMINATION WITHOUT ABNORMAL FINDINGS: ICD-10-CM

## 2025-01-29 DIAGNOSIS — B35.3 TINEA PEDIS: ICD-10-CM

## 2025-01-29 DIAGNOSIS — M19.172 POST-TRAUMATIC OSTEOARTHRITIS, LEFT ANKLE AND FOOT: ICD-10-CM

## 2025-01-29 PROCEDURE — 20600 DRAIN/INJ JOINT/BURSA W/O US: CPT | Mod: LT

## 2025-01-29 PROCEDURE — 99213 OFFICE O/P EST LOW 20 MIN: CPT | Mod: 25

## 2025-01-29 PROCEDURE — 11755 BIOPSY NAIL UNIT: CPT | Mod: 59

## 2025-01-29 PROCEDURE — 88311 DECALCIFY TISSUE: CPT

## 2025-01-29 PROCEDURE — 88304 TISSUE EXAM BY PATHOLOGIST: CPT

## 2025-01-29 PROCEDURE — 88312 SPECIAL STAINS GROUP 1: CPT

## 2025-01-29 RX ORDER — CICLOPIROX 71.3 MG/ML
8 SOLUTION TOPICAL
Qty: 1 | Refills: 6 | Status: ACTIVE | COMMUNITY
Start: 2025-01-29 | End: 1900-01-01

## 2025-01-29 RX ORDER — CICLOPIROX OLAMINE 7.7 MG/G
0.77 CREAM TOPICAL TWICE DAILY
Qty: 1 | Refills: 2 | Status: ACTIVE | COMMUNITY
Start: 2025-01-29 | End: 1900-01-01

## 2025-01-31 LAB — CORE LAB BIOPSY: NORMAL

## 2025-02-03 DIAGNOSIS — B35.1 TINEA UNGUIUM: ICD-10-CM
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
## 2025-02-03 DIAGNOSIS — M79.672 PAIN IN LEFT FOOT: ICD-10-CM
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
## 2025-02-03 DIAGNOSIS — B35.3 TINEA PEDIS: ICD-10-CM

## 2025-02-03 DIAGNOSIS — M79.671 PAIN IN RIGHT FOOT: ICD-10-CM

## 2025-02-13 ENCOUNTER — OUTPATIENT (OUTPATIENT)
Dept: OUTPATIENT SERVICES | Facility: HOSPITAL | Age: 50
LOS: 1 days | End: 2025-02-13
Payer: COMMERCIAL

## 2025-02-13 ENCOUNTER — APPOINTMENT (OUTPATIENT)
Dept: INTERNAL MEDICINE | Facility: CLINIC | Age: 50
End: 2025-02-13
Payer: COMMERCIAL

## 2025-02-13 VITALS
WEIGHT: 136 LBS | BODY MASS INDEX: 24.1 KG/M2 | HEIGHT: 63 IN | TEMPERATURE: 97.7 F | OXYGEN SATURATION: 100 % | HEART RATE: 83 BPM | SYSTOLIC BLOOD PRESSURE: 138 MMHG | DIASTOLIC BLOOD PRESSURE: 83 MMHG

## 2025-02-13 DIAGNOSIS — R09.82 POSTNASAL DRIP: ICD-10-CM

## 2025-02-13 DIAGNOSIS — I10 ESSENTIAL (PRIMARY) HYPERTENSION: ICD-10-CM

## 2025-02-13 DIAGNOSIS — Z00.00 ENCOUNTER FOR GENERAL ADULT MEDICAL EXAMINATION WITHOUT ABNORMAL FINDINGS: ICD-10-CM

## 2025-02-13 DIAGNOSIS — R05.9 COUGH, UNSPECIFIED: ICD-10-CM

## 2025-02-13 PROCEDURE — 99214 OFFICE O/P EST MOD 30 MIN: CPT | Mod: GC

## 2025-02-13 PROCEDURE — 99214 OFFICE O/P EST MOD 30 MIN: CPT

## 2025-02-13 RX ORDER — FLUTICASONE PROPIONATE 50 UG/1
50 SPRAY, METERED NASAL DAILY
Qty: 1 | Refills: 0 | Status: ACTIVE | COMMUNITY
Start: 2025-02-13 | End: 1900-01-01

## 2025-02-13 RX ORDER — CHLORTHALIDONE 25 MG/1
25 TABLET ORAL DAILY
Qty: 30 | Refills: 3 | Status: ACTIVE | COMMUNITY
Start: 2025-02-13 | End: 1900-01-01

## 2025-02-13 RX ORDER — BENZONATATE 100 MG/1
100 CAPSULE ORAL 3 TIMES DAILY
Qty: 90 | Refills: 0 | Status: ACTIVE | COMMUNITY
Start: 2025-02-13 | End: 1900-01-01

## 2025-02-17 DIAGNOSIS — R05.9 COUGH, UNSPECIFIED: ICD-10-CM

## 2025-02-17 DIAGNOSIS — I10 ESSENTIAL (PRIMARY) HYPERTENSION: ICD-10-CM

## 2025-02-17 DIAGNOSIS — R09.82 POSTNASAL DRIP: ICD-10-CM

## 2025-02-19 ENCOUNTER — OUTPATIENT (OUTPATIENT)
Dept: OUTPATIENT SERVICES | Facility: HOSPITAL | Age: 50
LOS: 1 days | End: 2025-02-19
Payer: COMMERCIAL

## 2025-02-19 ENCOUNTER — APPOINTMENT (OUTPATIENT)
Dept: PODIATRY | Facility: CLINIC | Age: 50
End: 2025-02-19
Payer: COMMERCIAL

## 2025-02-19 DIAGNOSIS — R73.03 PREDIABETES.: ICD-10-CM
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
## 2025-02-19 DIAGNOSIS — M79.672 PAIN IN LEFT FOOT: ICD-10-CM

## 2025-02-19 DIAGNOSIS — Z00.00 ENCOUNTER FOR GENERAL ADULT MEDICAL EXAMINATION WITHOUT ABNORMAL FINDINGS: ICD-10-CM
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
## 2025-02-19 DIAGNOSIS — R09.82 POSTNASAL DRIP: ICD-10-CM

## 2025-02-19 LAB
ESTIMATED AVERAGE GLUCOSE: 123 MG/DL
HBA1C MFR BLD HPLC: 5.9 %
HCT VFR BLD CALC: 43.9 %
HGB BLD-MCNC: 14.3 G/DL
MCHC RBC-ENTMCNC: 28.7 PG
MCHC RBC-ENTMCNC: 32.6 G/DL
MCV RBC AUTO: 88.2 FL
PLATELET # BLD AUTO: 275 K/UL
PMV BLD AUTO: 0 /100 WBCS
PMV BLD: 11.9 FL
RBC # BLD: 4.98 M/UL
RBC # FLD: 13.8 %
TSH SERPL-ACNC: 2.93 UIU/ML
WBC # FLD AUTO: 5.17 K/UL

## 2025-02-19 PROCEDURE — 85027 COMPLETE CBC AUTOMATED: CPT

## 2025-02-19 PROCEDURE — 84443 ASSAY THYROID STIM HORMONE: CPT

## 2025-02-19 PROCEDURE — 80061 LIPID PANEL: CPT

## 2025-02-19 PROCEDURE — 83036 HEMOGLOBIN GLYCOSYLATED A1C: CPT

## 2025-02-19 PROCEDURE — 99213 OFFICE O/P EST LOW 20 MIN: CPT

## 2025-02-19 PROCEDURE — 80053 COMPREHEN METABOLIC PANEL: CPT

## 2025-02-20 DIAGNOSIS — E78.5 HYPERLIPIDEMIA, UNSPECIFIED: ICD-10-CM

## 2025-02-20 DIAGNOSIS — R09.82 POSTNASAL DRIP: ICD-10-CM

## 2025-02-20 LAB
ALBUMIN SERPL ELPH-MCNC: 5.2 G/DL
ALP BLD-CCNC: 117 U/L
ALT SERPL-CCNC: 24 U/L
ANION GAP SERPL CALC-SCNC: 16 MMOL/L
AST SERPL-CCNC: 27 U/L
BILIRUB SERPL-MCNC: 0.9 MG/DL
BUN SERPL-MCNC: 14 MG/DL
CALCIUM SERPL-MCNC: 10 MG/DL
CHLORIDE SERPL-SCNC: 97 MMOL/L
CHOLEST SERPL-MCNC: 288 MG/DL
CO2 SERPL-SCNC: 26 MMOL/L
CREAT SERPL-MCNC: 0.8 MG/DL
EGFR: 89 ML/MIN/1.73M2
GLUCOSE SERPL-MCNC: 97 MG/DL
HDLC SERPL-MCNC: 90 MG/DL
LDLC SERPL CALC-MCNC: 177 MG/DL
NONHDLC SERPL-MCNC: 198 MG/DL
POTASSIUM SERPL-SCNC: 4 MMOL/L
PROT SERPL-MCNC: 7.7 G/DL
SODIUM SERPL-SCNC: 139 MMOL/L
TRIGL SERPL-MCNC: 123 MG/DL

## 2025-02-24 ENCOUNTER — OUTPATIENT (OUTPATIENT)
Dept: OUTPATIENT SERVICES | Facility: HOSPITAL | Age: 50
LOS: 1 days | End: 2025-02-24
Payer: COMMERCIAL

## 2025-02-24 ENCOUNTER — RESULT REVIEW (OUTPATIENT)
Age: 50
End: 2025-02-24

## 2025-02-24 DIAGNOSIS — R05.9 COUGH, UNSPECIFIED: ICD-10-CM

## 2025-02-24 PROCEDURE — 71046 X-RAY EXAM CHEST 2 VIEWS: CPT | Mod: 26

## 2025-02-24 PROCEDURE — 71046 X-RAY EXAM CHEST 2 VIEWS: CPT

## 2025-02-25 DIAGNOSIS — R05.9 COUGH, UNSPECIFIED: ICD-10-CM

## 2025-03-13 ENCOUNTER — RESULT REVIEW (OUTPATIENT)
Age: 50
End: 2025-03-13

## 2025-03-13 ENCOUNTER — OUTPATIENT (OUTPATIENT)
Dept: OUTPATIENT SERVICES | Facility: HOSPITAL | Age: 50
LOS: 1 days | End: 2025-03-13
Payer: COMMERCIAL

## 2025-03-13 DIAGNOSIS — Z00.8 ENCOUNTER FOR OTHER GENERAL EXAMINATION: ICD-10-CM

## 2025-03-13 DIAGNOSIS — M79.672 PAIN IN LEFT FOOT: ICD-10-CM

## 2025-03-13 PROCEDURE — 73718 MRI LOWER EXTREMITY W/O DYE: CPT | Mod: LT

## 2025-03-13 PROCEDURE — 73718 MRI LOWER EXTREMITY W/O DYE: CPT | Mod: 26,LT

## 2025-03-14 DIAGNOSIS — M79.672 PAIN IN LEFT FOOT: ICD-10-CM

## 2025-03-17 ENCOUNTER — EMERGENCY (EMERGENCY)
Facility: HOSPITAL | Age: 50
LOS: 0 days | Discharge: HOME | End: 2025-03-18
Attending: EMERGENCY MEDICINE
Payer: MEDICAID

## 2025-03-17 VITALS
HEART RATE: 100 BPM | DIASTOLIC BLOOD PRESSURE: 85 MMHG | TEMPERATURE: 99 F | OXYGEN SATURATION: 99 % | RESPIRATION RATE: 20 BRPM | SYSTOLIC BLOOD PRESSURE: 129 MMHG

## 2025-03-17 DIAGNOSIS — M79.651 PAIN IN RIGHT THIGH: ICD-10-CM

## 2025-03-17 DIAGNOSIS — E03.9 HYPOTHYROIDISM, UNSPECIFIED: ICD-10-CM
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
## 2025-03-17 DIAGNOSIS — R51.9 HEADACHE, UNSPECIFIED: ICD-10-CM
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
## 2025-03-17 DIAGNOSIS — M25.531 PAIN IN RIGHT WRIST: ICD-10-CM

## 2025-03-17 PROCEDURE — 99284 EMERGENCY DEPT VISIT MOD MDM: CPT | Mod: 25

## 2025-03-17 PROCEDURE — 96372 THER/PROPH/DIAG INJ SC/IM: CPT

## 2025-03-17 PROCEDURE — 73110 X-RAY EXAM OF WRIST: CPT | Mod: 26,RT

## 2025-03-17 PROCEDURE — 73130 X-RAY EXAM OF HAND: CPT | Mod: 26,RT

## 2025-03-17 PROCEDURE — 73130 X-RAY EXAM OF HAND: CPT | Mod: RT

## 2025-03-17 PROCEDURE — 99284 EMERGENCY DEPT VISIT MOD MDM: CPT

## 2025-03-17 PROCEDURE — 73110 X-RAY EXAM OF WRIST: CPT | Mod: RT

## 2025-03-17 RX ORDER — KETOROLAC TROMETHAMINE 30 MG/ML
30 INJECTION, SOLUTION INTRAMUSCULAR; INTRAVENOUS ONCE
Refills: 0 | Status: DISCONTINUED | OUTPATIENT
Start: 2025-03-17 | End: 2025-03-17

## 2025-03-17 RX ORDER — IBUPROFEN 200 MG
1 TABLET ORAL
Qty: 40 | Refills: 0
Start: 2025-03-17 | End: 2025-03-26

## 2025-03-17 RX ORDER — METHOCARBAMOL 500 MG/1
2 TABLET, FILM COATED ORAL
Qty: 30 | Refills: 0
Start: 2025-03-17 | End: 2025-03-21

## 2025-03-17 RX ADMIN — KETOROLAC TROMETHAMINE 30 MILLIGRAM(S): 30 INJECTION, SOLUTION INTRAMUSCULAR; INTRAVENOUS at 23:03
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
## 2025-03-17 NOTE — ED PROVIDER NOTE - PHYSICAL EXAMINATION
VITAL SIGNS: I have reviewed nursing notes and confirm.  CONSTITUTIONAL: Well-developed; well-nourished; in no acute distress.  SKIN: Skin exam is warm and dry, no acute rash.  HEAD: Normocephalic; atraumatic.  EYES: conjunctiva and sclera clear.  ENT: No nasal discharge; airway clear. TMs clear.  CARD: S1, S2 normal; no murmurs, gallops, or rubs. Regular rate and rhythm.  RESP: Normal respiratory effort, no tachypnea or distress. Lungs CTAB, no wheezes, rales or rhonchi.  EXT: Normal ROM. No clubbing, cyanosis or edema. milt tenderness of R maxilla, L rear deltoid, and R lateral thigh, and R 3rd metacarpal palmar surface  NEURO: Alert, oriented. Grossly unremarkable. No focal deficits. no ataxia  PSYCH: Cooperative, appropriate.
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
## 2025-03-17 NOTE — ED PROVIDER NOTE - PATIENT PORTAL LINK FT
You can access the FollowMyHealth Patient Portal offered by [facility] by registering at the following website: [email or web address]. By joining [employer]’s FollowMyHealth portal, you will also be able to view your health information using other applications (apps) compatible with our system.
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
## 2025-03-26 ENCOUNTER — APPOINTMENT (OUTPATIENT)
Dept: PODIATRY | Facility: CLINIC | Age: 50
End: 2025-03-26
Payer: COMMERCIAL

## 2025-03-26 ENCOUNTER — OUTPATIENT (OUTPATIENT)
Dept: OUTPATIENT SERVICES | Facility: HOSPITAL | Age: 50
LOS: 1 days | End: 2025-03-26
Payer: COMMERCIAL

## 2025-03-26 DIAGNOSIS — M79.672 PAIN IN LEFT FOOT: ICD-10-CM

## 2025-03-26 DIAGNOSIS — Z00.00 ENCOUNTER FOR GENERAL ADULT MEDICAL EXAMINATION WITHOUT ABNORMAL FINDINGS: ICD-10-CM

## 2025-03-26 PROCEDURE — 99213 OFFICE O/P EST LOW 20 MIN: CPT

## 2025-03-26 PROCEDURE — 99214 OFFICE O/P EST MOD 30 MIN: CPT

## 2025-04-03 DIAGNOSIS — M79.672 PAIN IN LEFT FOOT: ICD-10-CM
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
## 2025-05-28 ENCOUNTER — NON-APPOINTMENT (OUTPATIENT)
Age: 50
End: 2025-05-28

## 2025-05-28 ENCOUNTER — EMERGENCY (EMERGENCY)
Facility: HOSPITAL | Age: 50
LOS: 0 days | Discharge: HOME | End: 2025-05-29
Attending: EMERGENCY MEDICINE
Payer: MEDICAID

## 2025-05-28 VITALS
TEMPERATURE: 99 F | HEART RATE: 91 BPM | WEIGHT: 137.79 LBS | SYSTOLIC BLOOD PRESSURE: 135 MMHG | DIASTOLIC BLOOD PRESSURE: 95 MMHG | OXYGEN SATURATION: 97 % | RESPIRATION RATE: 18 BRPM | HEIGHT: 61 IN

## 2025-05-28 DIAGNOSIS — I10 ESSENTIAL (PRIMARY) HYPERTENSION: ICD-10-CM

## 2025-05-28 DIAGNOSIS — E87.6 HYPOKALEMIA: ICD-10-CM

## 2025-05-28 DIAGNOSIS — R42 DIZZINESS AND GIDDINESS: ICD-10-CM

## 2025-05-28 DIAGNOSIS — R20.2 PARESTHESIA OF SKIN: ICD-10-CM

## 2025-05-28 PROCEDURE — 85018 HEMOGLOBIN: CPT

## 2025-05-28 PROCEDURE — 82330 ASSAY OF CALCIUM: CPT

## 2025-05-28 PROCEDURE — 84132 ASSAY OF SERUM POTASSIUM: CPT

## 2025-05-28 PROCEDURE — 83605 ASSAY OF LACTIC ACID: CPT

## 2025-05-28 PROCEDURE — 85014 HEMATOCRIT: CPT

## 2025-05-28 PROCEDURE — 93005 ELECTROCARDIOGRAM TRACING: CPT

## 2025-05-28 PROCEDURE — 85025 COMPLETE CBC W/AUTO DIFF WBC: CPT

## 2025-05-28 PROCEDURE — 99284 EMERGENCY DEPT VISIT MOD MDM: CPT

## 2025-05-28 PROCEDURE — 80053 COMPREHEN METABOLIC PANEL: CPT

## 2025-05-28 PROCEDURE — 36000 PLACE NEEDLE IN VEIN: CPT

## 2025-05-28 PROCEDURE — 84295 ASSAY OF SERUM SODIUM: CPT

## 2025-05-28 PROCEDURE — 81003 URINALYSIS AUTO W/O SCOPE: CPT

## 2025-05-28 PROCEDURE — 82962 GLUCOSE BLOOD TEST: CPT

## 2025-05-28 PROCEDURE — 36415 COLL VENOUS BLD VENIPUNCTURE: CPT

## 2025-05-28 PROCEDURE — 99283 EMERGENCY DEPT VISIT LOW MDM: CPT | Mod: 25

## 2025-05-28 PROCEDURE — 82803 BLOOD GASES ANY COMBINATION: CPT

## 2025-05-28 PROCEDURE — 84703 CHORIONIC GONADOTROPIN ASSAY: CPT

## 2025-05-28 NOTE — ED PROVIDER NOTE - NSFOLLOWUPINSTRUCTIONS_ED_ALL_ED_FT
Nuestros coordinadores de referencias del departamento de emergencias se comunicarán con usted en las próximas 24 a  48 horas de 9:00 a. m. a 5:00 p. m. (de lunes a viernes) con noah payton de seguimiento. Espere noah llamada telefónica del hospital en franchesca período de tiempo. Si no recibe noah llamada o si tiene alguna pregunta o inquietud, puede comunicarse con sandraos al [phone].    Parestesia  Paresthesia  La parestesia es noah sensación de ardor o picazón fuera de lo normal. Suele sentirse en las vernoa, los brazos, las piernas o los pies. Sin embargo, puede manifestarse en cualquier parte del cuerpo. Por lo general, la parestesia no es dolorosa. Se puede sentir jaycee lo siguiente:  Hormigueo o entumecimiento.  Vibración.  Picazón.  La parestesia puede producirse sin causa aparente, o puede ser provocada por:  Respirar demasiado rápido (hiperventilación).  Presión en un nervio.  Noah afección médica subyacente.  Efectos secundarios de un medicamento.  Deficiencias nutricionales.  Exposición a sustancias químicas.  La mayoría de las personas sienten parestesia temporal en algún momento de la sonny. Para algunas personas, puede ser a karson plazo (crónica) debido a noah afección médica subyacente. Si tiene parestesia por mucho tiempo, es necesario que el médico le deandre noah evaluación.    Siga estas indicaciones en raines casa:  Nutrición    A plate with examples of foods in a healthy diet.  Siga noah dieta saludable. [place] puede comprender lo siguiente:  Consumir alimentos ricos en fibra, jaycee frijoles, cereales integrales, y frutas y verduras frescas.  Limitar el consumo de alimentos ricos en grasas y azúcares procesados, jaycee los alimentos fritos o dulces.  Consumo de alcohol    A sign showing that a person should not drink alcohol.  Evite o limite el consumo alcohol. El exceso de alcohol puede causar noah deficiencia de vitamina B, y esta es necesaria para conservar el buen estado de los nervios.  No rhonda alcohol si:  [place]aines médico le indica no hacerlo.  Está embarazada, puede estar embarazada o está tratando de quedar embarazada.  Si rachele alcohol:  Limite la cantidad que rachele a lo siguiente:  De 0 a 1 medida por día para las mujeres.  De 0 a 2 medidas por día para los hombres.  Sepa cuánta cantidad de alcohol hay en las bebidas que bony. En los [place], noah medida equivale a noah botella de cerveza de 12 oz (355 ml), un vaso de vino de 5 oz (148 ml) o un vaso de noah bebida alcohólica de juan jose graduación de 1½ oz (44 ml).  Indicaciones generales    Use los medicamentos de venta louise y los recetados solamente jaycee se lo haya indicado el médico.  No consuma ningún producto que contenga nicotina o tabaco. Estos productos incluyen cigarrillos, tabaco para mascar y aparatos de vapeo, jaycee los cigarrillos electrónicos. Si necesita ayuda para dejar de consumir estos productos, consulte al médico.  Si tiene diabetes, trabaje estrechamente con el médico para mantener bajo control el nivel de azúcar en la rima.  Si siente adormecimiento en los pies, deandre lo siguiente:  Realice controles todos los días para detectar signos de lesión o infección. Observe señales de enrojecimiento, calor e hinchazón.  Use calcetines acolchados y zapatos cómodos. Estos ayudan a proteger los pies.  Concurra a todas las visitas de seguimiento. [place] es importante.  Comuníquese con un médico si:  Tiene parestesia que empeora o no desaparece.  Siente adormecimiento después de noah lesión.  La sensación de ardor o picazón empeora al caminar.  Tiene dolor, calambres o mareos, o se desmaya.  Aparece noah erupción cutánea.  Solicite ayuda de inmediato si:  Siente debilidad muscular.  Comienza a tener debilidad en un brazo o noah pierna.  Tiene dificultad para caminar o moverse.  Tiene problemas con el habla, la comprensión o la visión.  Se siente confundido.  No puede controlar la función de la vejiga o los intestinos.  Estos síntomas pueden indicar noah emergencia. Solicite ayuda de inmediato. Llame al 911.  No espere a rene si los síntomas desaparecen.  No conduzca por donna propios medios hasta el hospital.  Resumen  La parestesia es noah sensación de ardor o picazón fuera de lo normal que generalmente se siente en las verona, los brazos, las piernas o los pies. También pueden producirse en otras partes del cuerpo.  La parestesia puede producirse sin causa aparente, o puede deberse a respirar muy rápidamente (hiperventilación), la presión en un nervio, noah afección médica subyacente, los efectos secundarios de un medicamento, deficiencias nutricionales o la exposición a sustancias tóxicas.  Si tiene parestesia por mucho tiempo, es necesario que el médico le deandre noah evaluación.  Esta información no tiene jaycee fin reemplazar el consejo del médico. Asegúrese de hacerle al médico cualquier pregunta que tenga.

## 2025-05-28 NOTE — ED PROVIDER NOTE - CARE PLAN
Assessment and plan of treatment:	weak/polydipsia/paresthesias b/l UE  labs, ekg, supportive care/ poct fs   Principal Discharge DX:	Paresthesias  Assessment and plan of treatment:	weak/polydipsia/paresthesias b/l UE  labs, ekg, supportive care/ poct fs  Secondary Diagnosis:	Hypokalemia   1

## 2025-05-28 NOTE — ED PROVIDER NOTE - CLINICAL SUMMARY MEDICAL DECISION MAKING FREE TEXT BOX
pt pw gen malaise, nausea, polydipsia, b/l UE tingling. neuro exam was nml, including 2 pt discrim of her extremities x4. labs checked, K 3.4, replaced. ekg was unremarkable. pt has close outpt f/u. will give neurosurg f/u for possible cervical spine eval for b/l UE paresthesias.pt has no nausea in ED but is asking for Rx for anti nausea meds, sent to her pharmacy.

## 2025-05-28 NOTE — ED PROVIDER NOTE - PATIENT PORTAL LINK FT
You can access the FollowMyHealth Patient Portal offered by [facility] by registering at the following website: [email or web address]. By joining [facility]’s FollowMyHealth portal, you will also be able to view your health information using other applications (apps) compatible with our system.

## 2025-05-28 NOTE — ED PROVIDER NOTE - OBJECTIVE STATEMENT
51 yo f hx of htn and on post menopausal HRT, c/o feeling shaky, b/l arm tingling for 4 days. felt feverish and chills sunday but none recently. mild ha. +thirst despite drinking water. no hx of DM and not taking hypoglycemics. feels lightheaded when she stands. no speech or vision changes. the numbness started when she woke up and improved after about 15 min. no weakness.

## 2025-05-28 NOTE — ED PROVIDER NOTE - PHYSICAL EXAMINATION
VITAL SIGNS: I have reviewed nursing notes and confirm.  CONSTITUTIONAL: Well-developed; well-nourished; in no acute distress.  SKIN: Skin exam is warm and dry, no acute rash.  HEAD: Normocephalic; atraumatic.  EYES: PERRL, EOM intact; conjunctiva and sclera clear.  ENT: No nasal discharge; airway clear.   NECK: Supple; non tender.  CARD:+ S1, S2   RESP: No wheezes, rales or rhonchi.  ABD: Normal bowel sounds; soft; non-distended; non-tender;  EXT: Normal ROM. No cyanosis or edema.  LYMPH: No acute adenopathy.  NEURO: Alert and oriented, face symmetric and speech fluent. Strength 5/5 x 4 ext and symmetric, nml gross motor movement, nml RRM/ERMELINDA/FTN, nml gait. No focal deficits noted.  PSYCH: Cooperative, appropriate.

## 2025-05-29 LAB
ALBUMIN SERPL ELPH-MCNC: 4.5 G/DL — SIGNIFICANT CHANGE UP (ref 3.5–5.2)
ALP SERPL-CCNC: 109 U/L — SIGNIFICANT CHANGE UP (ref 30–115)
ALT FLD-CCNC: 45 U/L — HIGH (ref 0–41)
ANION GAP SERPL CALC-SCNC: 12 MMOL/L — SIGNIFICANT CHANGE UP (ref 7–14)
APPEARANCE UR: CLEAR — SIGNIFICANT CHANGE UP
AST SERPL-CCNC: 52 U/L — HIGH (ref 0–41)
BASE EXCESS BLDV CALC-SCNC: 4.7 MMOL/L — HIGH (ref -2–3)
BASOPHILS # BLD AUTO: 0.02 K/UL — SIGNIFICANT CHANGE UP (ref 0–0.2)
BASOPHILS NFR BLD AUTO: 0.5 % — SIGNIFICANT CHANGE UP (ref 0–1)
BILIRUB SERPL-MCNC: 1.2 MG/DL — SIGNIFICANT CHANGE UP (ref 0.2–1.2)
BILIRUB UR-MCNC: NEGATIVE — SIGNIFICANT CHANGE UP
BUN SERPL-MCNC: 11 MG/DL — SIGNIFICANT CHANGE UP (ref 10–20)
CA-I SERPL-SCNC: 1.09 MMOL/L — LOW (ref 1.15–1.33)
CALCIUM SERPL-MCNC: 9.3 MG/DL — SIGNIFICANT CHANGE UP (ref 8.4–10.5)
CHLORIDE SERPL-SCNC: 95 MMOL/L — LOW (ref 98–110)
CO2 SERPL-SCNC: 27 MMOL/L — SIGNIFICANT CHANGE UP (ref 17–32)
COLOR SPEC: YELLOW — SIGNIFICANT CHANGE UP
CREAT SERPL-MCNC: 0.7 MG/DL — SIGNIFICANT CHANGE UP (ref 0.7–1.5)
DIFF PNL FLD: NEGATIVE — SIGNIFICANT CHANGE UP
EGFR: 105 ML/MIN/1.73M2 — SIGNIFICANT CHANGE UP
EGFR: 105 ML/MIN/1.73M2 — SIGNIFICANT CHANGE UP
EOSINOPHIL # BLD AUTO: 0.01 K/UL — SIGNIFICANT CHANGE UP (ref 0–0.7)
EOSINOPHIL NFR BLD AUTO: 0.2 % — SIGNIFICANT CHANGE UP (ref 0–8)
GAS PNL BLDV: 130 MMOL/L — LOW (ref 136–145)
GAS PNL BLDV: SIGNIFICANT CHANGE UP
GLUCOSE SERPL-MCNC: 92 MG/DL — SIGNIFICANT CHANGE UP (ref 70–99)
GLUCOSE UR QL: NEGATIVE MG/DL — SIGNIFICANT CHANGE UP
HCG SERPL QL: NEGATIVE — SIGNIFICANT CHANGE UP
HCO3 BLDV-SCNC: 30 MMOL/L — HIGH (ref 22–29)
HCT VFR BLD CALC: 39.9 % — SIGNIFICANT CHANGE UP (ref 37–47)
HCT VFR BLDA CALC: 42 % — SIGNIFICANT CHANGE UP (ref 34.5–46.5)
HGB BLD CALC-MCNC: 14 G/DL — SIGNIFICANT CHANGE UP (ref 11.7–16.1)
HGB BLD-MCNC: 13.6 G/DL — SIGNIFICANT CHANGE UP (ref 12–16)
IMM GRANULOCYTES NFR BLD AUTO: 0.5 % — HIGH (ref 0.1–0.3)
KETONES UR QL: 15 MG/DL
LACTATE BLDV-MCNC: 1 MMOL/L — SIGNIFICANT CHANGE UP (ref 0.5–2)
LEUKOCYTE ESTERASE UR-ACNC: NEGATIVE — SIGNIFICANT CHANGE UP
LYMPHOCYTES # BLD AUTO: 1.31 K/UL — SIGNIFICANT CHANGE UP (ref 1.2–3.4)
LYMPHOCYTES # BLD AUTO: 30.5 % — SIGNIFICANT CHANGE UP (ref 20.5–51.1)
MCHC RBC-ENTMCNC: 29.1 PG — SIGNIFICANT CHANGE UP (ref 27–31)
MCHC RBC-ENTMCNC: 34.1 G/DL — SIGNIFICANT CHANGE UP (ref 32–37)
MCV RBC AUTO: 85.4 FL — SIGNIFICANT CHANGE UP (ref 81–99)
MONOCYTES # BLD AUTO: 0.34 K/UL — SIGNIFICANT CHANGE UP (ref 0.1–0.6)
MONOCYTES NFR BLD AUTO: 7.9 % — SIGNIFICANT CHANGE UP (ref 1.7–9.3)
NEUTROPHILS # BLD AUTO: 2.59 K/UL — SIGNIFICANT CHANGE UP (ref 1.4–6.5)
NEUTROPHILS NFR BLD AUTO: 60.4 % — SIGNIFICANT CHANGE UP (ref 42.2–75.2)
NITRITE UR-MCNC: NEGATIVE — SIGNIFICANT CHANGE UP
NRBC BLD AUTO-RTO: 0 /100 WBCS — SIGNIFICANT CHANGE UP (ref 0–0)
PCO2 BLDV: 48 MMHG — HIGH (ref 39–42)
PH BLDV: 7.41 — SIGNIFICANT CHANGE UP (ref 7.32–7.43)
PH UR: 7 — SIGNIFICANT CHANGE UP (ref 5–8)
PLATELET # BLD AUTO: 221 K/UL — SIGNIFICANT CHANGE UP (ref 130–400)
PMV BLD: 10.8 FL — HIGH (ref 7.4–10.4)
PO2 BLDV: 21 MMHG — LOW (ref 25–45)
POTASSIUM BLDV-SCNC: 4.4 MMOL/L — SIGNIFICANT CHANGE UP (ref 3.5–5.1)
POTASSIUM SERPL-MCNC: 3.4 MMOL/L — LOW (ref 3.5–5)
POTASSIUM SERPL-SCNC: 3.4 MMOL/L — LOW (ref 3.5–5)
PROT SERPL-MCNC: 6.8 G/DL — SIGNIFICANT CHANGE UP (ref 6–8)
PROT UR-MCNC: NEGATIVE MG/DL — SIGNIFICANT CHANGE UP
RBC # BLD: 4.67 M/UL — SIGNIFICANT CHANGE UP (ref 4.2–5.4)
RBC # FLD: 12.6 % — SIGNIFICANT CHANGE UP (ref 11.5–14.5)
SAO2 % BLDV: 31 % — LOW (ref 67–88)
SODIUM SERPL-SCNC: 134 MMOL/L — LOW (ref 135–146)
SP GR SPEC: 1.01 — SIGNIFICANT CHANGE UP (ref 1–1.03)
UROBILINOGEN FLD QL: 0.2 MG/DL — SIGNIFICANT CHANGE UP (ref 0.2–1)
WBC # BLD: 4.29 K/UL — LOW (ref 4.8–10.8)
WBC # FLD AUTO: 4.29 K/UL — LOW (ref 4.8–10.8)

## 2025-05-29 PROCEDURE — 93010 ELECTROCARDIOGRAM REPORT: CPT

## 2025-05-29 RX ORDER — ONDANSETRON HCL/PF 4 MG/2 ML
1 VIAL (ML) INJECTION
Qty: 1 | Refills: 0
Start: 2025-05-29 | End: 2025-06-02

## 2025-05-29 RX ADMIN — Medication 40 MILLIEQUIVALENT(S): at 01:20

## 2025-05-29 RX ADMIN — Medication 1000 MILLILITER(S): at 00:49

## 2025-05-30 NOTE — CHART NOTE - NSCHARTNOTEFT_GEN_A_CORE
Hazel [id number] - Prefers mornings. Sent to neurosurg chat, CT 5/29.  Appt scheduled 06/09/2025 11:30 AM w/ Dr. Little @ [address] [place] [address] (neurosurgery referral) CT 5/30.